# Patient Record
Sex: MALE | Race: WHITE | NOT HISPANIC OR LATINO | Employment: OTHER | URBAN - METROPOLITAN AREA
[De-identification: names, ages, dates, MRNs, and addresses within clinical notes are randomized per-mention and may not be internally consistent; named-entity substitution may affect disease eponyms.]

---

## 2023-08-30 ENCOUNTER — APPOINTMENT (OUTPATIENT)
Dept: LAB | Facility: CLINIC | Age: 63
End: 2023-08-30
Payer: MEDICARE

## 2023-08-30 DIAGNOSIS — J45.20 MILD INTERMITTENT INTRINSIC ASTHMA WITHOUT STATUS ASTHMATICUS WITHOUT COMPLICATION: ICD-10-CM

## 2023-08-30 DIAGNOSIS — Z83.3 FAMILY HISTORY OF DIABETES MELLITUS: ICD-10-CM

## 2023-08-30 DIAGNOSIS — Z85.118 PERSONAL HISTORY OF MALIGNANT NEOPLASM OF LUNG: ICD-10-CM

## 2023-08-30 DIAGNOSIS — E04.2 NONTOXIC MULTINODULAR GOITER: ICD-10-CM

## 2023-08-30 DIAGNOSIS — R53.83 FATIGUE, UNSPECIFIED TYPE: ICD-10-CM

## 2023-08-30 LAB — TSH SERPL DL<=0.05 MIU/L-ACNC: 0.48 UIU/ML (ref 0.45–4.5)

## 2023-08-30 PROCEDURE — 84443 ASSAY THYROID STIM HORMONE: CPT

## 2024-05-31 ENCOUNTER — APPOINTMENT (OUTPATIENT)
Dept: LAB | Facility: CLINIC | Age: 64
End: 2024-05-31
Payer: MEDICARE

## 2024-05-31 DIAGNOSIS — Z83.3 FAMILY HISTORY OF DIABETES MELLITUS: ICD-10-CM

## 2024-05-31 DIAGNOSIS — J45.20 MILD INTERMITTENT ASTHMA WITHOUT COMPLICATION: ICD-10-CM

## 2024-05-31 DIAGNOSIS — R63.4 ABNORMAL WEIGHT LOSS: ICD-10-CM

## 2024-05-31 DIAGNOSIS — R53.83 OTHER FATIGUE: ICD-10-CM

## 2024-05-31 DIAGNOSIS — E04.2 NONTOXIC MULTINODULAR GOITER: ICD-10-CM

## 2024-05-31 LAB
T3 SERPL-MCNC: 1.1 NG/ML
T3FREE SERPL-MCNC: 3.64 PG/ML (ref 2.5–3.9)
T4 SERPL-MCNC: 7.98 UG/DL (ref 6.09–12.23)

## 2024-05-31 PROCEDURE — 84436 ASSAY OF TOTAL THYROXINE: CPT

## 2024-05-31 PROCEDURE — 84480 ASSAY TRIIODOTHYRONINE (T3): CPT

## 2024-05-31 PROCEDURE — 84481 FREE ASSAY (FT-3): CPT

## 2024-05-31 PROCEDURE — 36415 COLL VENOUS BLD VENIPUNCTURE: CPT

## 2024-09-17 ENCOUNTER — OFFICE VISIT (OUTPATIENT)
Dept: PULMONOLOGY | Facility: MEDICAL CENTER | Age: 64
End: 2024-09-17
Payer: MEDICARE

## 2024-09-17 VITALS
SYSTOLIC BLOOD PRESSURE: 138 MMHG | TEMPERATURE: 98.2 F | OXYGEN SATURATION: 97 % | RESPIRATION RATE: 12 BRPM | DIASTOLIC BLOOD PRESSURE: 80 MMHG | WEIGHT: 176 LBS | HEART RATE: 82 BPM | HEIGHT: 67 IN | BODY MASS INDEX: 27.62 KG/M2

## 2024-09-17 DIAGNOSIS — J39.8 TRACHEAL COMPRESSION: Primary | ICD-10-CM

## 2024-09-17 DIAGNOSIS — C34.12 MALIGNANT NEOPLASM OF UPPER LOBE, LEFT BRONCHUS OR LUNG (HCC): ICD-10-CM

## 2024-09-17 DIAGNOSIS — E03.8 OTHER SPECIFIED HYPOTHYROIDISM: ICD-10-CM

## 2024-09-17 PROCEDURE — 99204 OFFICE O/P NEW MOD 45 MIN: CPT | Performed by: STUDENT IN AN ORGANIZED HEALTH CARE EDUCATION/TRAINING PROGRAM

## 2024-09-17 RX ORDER — CHOLECALCIFEROL (VITAMIN D3) 125 MCG
1000 CAPSULE ORAL DAILY
COMMUNITY

## 2024-09-17 RX ORDER — MELOXICAM 15 MG/1
TABLET ORAL
COMMUNITY

## 2024-09-17 RX ORDER — TURMERIC 400 MG
25 CAPSULE ORAL
COMMUNITY

## 2024-09-17 RX ORDER — GINSENG 100 MG
50 CAPSULE ORAL
COMMUNITY

## 2024-09-17 RX ORDER — ALBUTEROL SULFATE 90 UG/1
INHALANT RESPIRATORY (INHALATION)
COMMUNITY

## 2024-09-17 NOTE — PROGRESS NOTES
Pulmonary Outpatient Consultation Note   Miguel ABDUL 64 y.o. 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  I do not have any direct imaging to see how bad the compression is.  He has a extensive history that involves adenocarcinoma with a left upper lobe lobectomy, undergoing PET scan tomorrow.  He normally follows up with [facility] oncology group endocrinology group and pulmonary.  But now that he moved to this area he wants to see pulmonary and endocrinology here.  Apparently ENT looked at his vocal cords and says he has vocal cord dysfunction.  He does sound like he has tracheal abnormalities based on his expiratory sounds.  He feels like he is slowly progressing.  I discussed that he may require thyroidectomy which was discussed with him before by ENT surgery at [facility].  He wants to see endocrinology locally and see what their opinion is, he has had several appointments canceled unfortunately and keeps getting pushed back.  I will message his endocrinologist.  4 mm right pleural nodule, PET scan tomorrow by oncology service  Non-smoker  Stage III non-small cell adenocarcinoma status post left upper lobectomy with lymph node dissection, radiation and durvalumab which he no longer on due to joint discomfort follows with [facility] oncology.  No longer seeing thoracic surgery  Mild intermittent asthma, has tried Breo, Arnuity in the past, also rescue inhalers.  No relief.  I suspect this is more so from his upper airway with tracheal compression.  This has been going on for several years.  There is no imminent need to send him to the emergency room or stat referral to ENT for tracheostomy.    Plan:    Unfortunately most of his breathing issues are likely from tracheal  compression from the goiter.  I have no remedies for this.  He is waiting to see endocrinology and looking forward to their opinion to decide if he needs a thyroidectomy.  Regarding his mild asthma, inhalers did not work for him before, he does have rescue inhaler he can use as needed.  Obtain PFTs to evaluate for the lung functions  I will message his endocrinologist  He has a PET scan tomorrow at [facility], he will let me know if there are any abnormal findings or anything he needs to be biopsied.  No inhalers as of now  Follow up in 6 months   I have spent a total time of 42 minutes in caring for this patient on the day of the visit/encounter including Diagnostic results, Risks and benefits of tx options, Patient and family education, Importance of tx compliance, Impressions, Counseling / Coordination of care, Documenting in the medical record, Reviewing / ordering tests, medicine, procedures  , Obtaining or reviewing history  , and Communicating with other healthcare professionals .    History of Present Illness   HPI:    64-year-old gentleman referred to pulmonary, past medical history of stage III non-small cell lung cancer status post surgery chemotherapy radiation durvalumab, underwent lobectomy in 2019 along with lymph node dissection, pathology showed invasive adenocarcinoma with papillary micropapillary and solid patterns along with lymphovascular invasion with 26 node removal, PD-L1 positive.  He was started on pemetrexed and cisplatin in 2020 and did 4 cycles followed by radiation, he then started durvalumab in September 2020 and stopped because of abdominal pain.  Now most recently on surveillance by hematology at [facility].  He also follows with ENT for thyroid nodules as he has a large multinodular thyroid goiter with mediastinal extension, they held off on surgery as they felt he would be a poor candidate under general anesthesia.  He also saw pulmonary because he developed wheezing  after his lobectomy, did not have significant improvement with inhalers..  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    He is now here to establish care locally as he no longer sees thoracic surgery or pulmonary of [place].  He still continues to see oncology with [facility].  He now wants to establish with endocrinology but his appointment keeps getting canceled.  He is here today to establish with pulmonary for his possible asthma and tracheal compression.  He is able to do his most activities with no significant issues, he is going for a PET scan tomorrow.  He does not really use his rescue inhaler as he did not gain significant benefit from it.  Lifetime non-smoker, no significant family history of lung disease.  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    Father was a smoker, patient has second hand smoke     PFT September 2020 with mild obstructive defect, FEV1 2.5 which is 80%, FVC of 4.19 which is 100%, ratio at 60.  Scooped out pattern.    Care Everywhere reviewed        Review of Systems   Constitutional: Negative.    HENT:  Positive for voice change. Negative for postnasal drip, rhinorrhea and trouble swallowing.    Eyes: Negative.    Respiratory:  Positive for choking and wheezing. Negative for stridor.    Cardiovascular: Negative.    Gastrointestinal: Negative.    Endocrine: Negative.    Genitourinary: Negative.    Musculoskeletal: Negative.    Skin: Negative.    Allergic/Immunologic: Positive for immunocompromised state.   Neurological: Negative.    Hematological:  Positive for adenopathy.   Psychiatric/Behavioral: Negative.          Historical Information    No past medical history on file. Lung cancer   No past surgical history on file. ELIER lobectomy   No  "family history on file. Father was a smoker     Occupational History:      Social History     Tobacco Use   Smoking Status Never   Smokeless Tobacco Never       Meds/Allergies     Current Outpatient Medications:     albuterol (PROVENTIL HFA,VENTOLIN HFA) 90 mcg/act inhaler, Inhale, Disp: , Rfl:     Cholecalciferol (Vitamin D) 125 MCG (5000 UT) CAPS, Take 1,000 capsules by mouth daily, Disp: , Rfl:     MAGNESIUM PO, Take 200 mg by mouth, Disp: , Rfl:     meloxicam (MOBIC) 15 mg tablet, 1 BY MOUTH EVERY DAY FOR 14 DAYS THEN ONCE DAILY AS NEEDED FOR PAIN, Disp: , Rfl:     Turmeric 400 MG CAPS, Take 25 mcg by mouth, Disp: , Rfl:     Zinc 50 MG TABS, Take 50 mg by mouth, Disp: , Rfl:   No Known Allergies    Vitals: Blood pressure 138/80, pulse 82, temperature 98.2 °F (36.8 °C), temperature source Temporal, resp. rate 12, height 5' 6.5\" (1.689 m), weight 79.8 kg (176 lb), SpO2 97%., Body mass index is 27.98 kg/m². Oxygen Therapy  SpO2: 97 %    Physical Exam:    GEN: alert and oriented x 3, pleasant and cooperative   HEENT:  Normocephalic, atraumatic, enlarged neck goiter, non tender   NECK: No JVD   HEART: Rate, normal S1 and S2  LUNGS: Clear to auscultation bilaterally; no wheezes, rales, or rhonchi; respiration nonlabored   ABDOMEN:  Normoactive bowel sounds, soft, no tenderness, no distention  EXTREMITIES: no edema  NEURO: no gross focal findings  SKIN:  Dry, intact, warm to touch    Labs: I have personally reviewed pertinent lab results.  No results found for: \"IGE\"    Imaging and other studies: Reviewed radiology reports from this admission including: CT chest.  Left upper lobe lobectomy, 4 mm right pleural nodule.  Unable to view images    Pulmonary function testing:  Personally interpreted by me  Mild obstruction on spirometry    Other Studies: Reviewed radiology reports from this admission including: Care Everywhere reviewed.    Enrrique Mercado MD  Pulmonary and Critical Care Medicine     "

## 2024-09-17 NOTE — PATIENT INSTRUCTIONS
It was a pleasure seeing you today!    Obtain PFT  Follow up in 6 months     Enrrique Mercado MD  Pulmonary and Critical Care Medicine

## 2024-09-17 NOTE — Clinical Note
Hey! I hope all is well. This patient used to follow with ENT, endocrinology, thoracic surgery and oncology at [facility].  Now here to establish care.  Large goiter which has been relatively stable for several years which is compressing his trachea.  He says he has some abnormal labs.  He is hoping to see you soon and I guess the appointments keep getting canceled.  He is looking forward to your opinion regarding further management and thyroidectomy. I think this is causing his WHITEHEAD/SOB not really pulmonary related as much. Thanks - Enrrique

## 2024-09-19 ENCOUNTER — APPOINTMENT (OUTPATIENT)
Dept: LAB | Facility: HOSPITAL | Age: 64
End: 2024-09-19
Attending: STUDENT IN AN ORGANIZED HEALTH CARE EDUCATION/TRAINING PROGRAM
Payer: MEDICARE

## 2024-09-19 DIAGNOSIS — E03.8 OTHER SPECIFIED HYPOTHYROIDISM: ICD-10-CM

## 2024-09-19 LAB
T3 SERPL-MCNC: 1 NG/ML
T3FREE SERPL-MCNC: 3.23 PG/ML (ref 2.5–3.9)
T4 FREE SERPL-MCNC: 0.89 NG/DL (ref 0.61–1.12)
TSH SERPL DL<=0.05 MIU/L-ACNC: 0.21 UIU/ML (ref 0.45–4.5)

## 2024-09-19 PROCEDURE — 84481 FREE ASSAY (FT-3): CPT

## 2024-09-19 PROCEDURE — 84439 ASSAY OF FREE THYROXINE: CPT

## 2024-09-19 PROCEDURE — 84443 ASSAY THYROID STIM HORMONE: CPT

## 2024-09-19 PROCEDURE — 36415 COLL VENOUS BLD VENIPUNCTURE: CPT

## 2024-09-19 PROCEDURE — 84480 ASSAY TRIIODOTHYRONINE (T3): CPT

## 2024-09-24 ENCOUNTER — TELEPHONE (OUTPATIENT)
Dept: PULMONOLOGY | Facility: CLINIC | Age: 64
End: 2024-09-24

## 2024-09-24 ENCOUNTER — HOSPITAL ENCOUNTER (OUTPATIENT)
Dept: PULMONOLOGY | Facility: HOSPITAL | Age: 64
Discharge: HOME | End: 2024-09-24
Attending: STUDENT IN AN ORGANIZED HEALTH CARE EDUCATION/TRAINING PROGRAM
Payer: MEDICARE

## 2024-09-24 DIAGNOSIS — J39.8 TRACHEAL COMPRESSION: ICD-10-CM

## 2024-09-24 PROCEDURE — 94726 PLETHYSMOGRAPHY LUNG VOLUMES: CPT | Performed by: STUDENT IN AN ORGANIZED HEALTH CARE EDUCATION/TRAINING PROGRAM

## 2024-09-24 PROCEDURE — 94726 PLETHYSMOGRAPHY LUNG VOLUMES: CPT

## 2024-09-24 PROCEDURE — 94729 DIFFUSING CAPACITY: CPT

## 2024-09-24 PROCEDURE — 94060 EVALUATION OF WHEEZING: CPT

## 2024-09-24 PROCEDURE — 94760 N-INVAS EAR/PLS OXIMETRY 1: CPT

## 2024-09-24 PROCEDURE — 94060 EVALUATION OF WHEEZING: CPT | Performed by: STUDENT IN AN ORGANIZED HEALTH CARE EDUCATION/TRAINING PROGRAM

## 2024-09-24 PROCEDURE — 94729 DIFFUSING CAPACITY: CPT | Performed by: STUDENT IN AN ORGANIZED HEALTH CARE EDUCATION/TRAINING PROGRAM

## 2024-09-24 RX ORDER — ALBUTEROL SULFATE 0.83 MG/ML
2.5 SOLUTION RESPIRATORY (INHALATION) ONCE
Status: COMPLETED | OUTPATIENT
Start: 2024-09-24 | End: 2024-09-24

## 2024-09-24 RX ADMIN — ALBUTEROL SULFATE 2.5 MG: 2.5 SOLUTION RESPIRATORY (INHALATION) at 09:59

## 2024-09-24 NOTE — TELEPHONE ENCOUNTER
Pulmonary    Discussed PFTs. Compared to his previous PFT at the other hospital there is slight improvement. Of note, he had his outside hospital PET scan, he will see Oncology tomorrow and discuss. All questions answered.     SK

## 2024-10-01 ENCOUNTER — TELEPHONE (OUTPATIENT)
Dept: ENDOCRINOLOGY | Facility: CLINIC | Age: 64
End: 2024-10-01

## 2024-10-02 ENCOUNTER — OFFICE VISIT (OUTPATIENT)
Dept: OBGYN CLINIC | Facility: CLINIC | Age: 64
End: 2024-10-02
Payer: MEDICARE

## 2024-10-02 ENCOUNTER — TELEPHONE (OUTPATIENT)
Age: 64
End: 2024-10-02

## 2024-10-02 ENCOUNTER — APPOINTMENT (OUTPATIENT)
Dept: RADIOLOGY | Facility: CLINIC | Age: 64
End: 2024-10-02
Payer: MEDICARE

## 2024-10-02 VITALS
HEIGHT: 67 IN | DIASTOLIC BLOOD PRESSURE: 71 MMHG | HEART RATE: 78 BPM | WEIGHT: 176 LBS | BODY MASS INDEX: 27.62 KG/M2 | SYSTOLIC BLOOD PRESSURE: 119 MMHG

## 2024-10-02 DIAGNOSIS — M54.50 LOW BACK PAIN, UNSPECIFIED BACK PAIN LATERALITY, UNSPECIFIED CHRONICITY, UNSPECIFIED WHETHER SCIATICA PRESENT: ICD-10-CM

## 2024-10-02 DIAGNOSIS — M16.11 PRIMARY OSTEOARTHRITIS OF ONE HIP, RIGHT: ICD-10-CM

## 2024-10-02 DIAGNOSIS — M54.16 CHRONIC RIGHT-SIDED LUMBAR RADICULOPATHY: Primary | ICD-10-CM

## 2024-10-02 PROCEDURE — 72100 X-RAY EXAM L-S SPINE 2/3 VWS: CPT

## 2024-10-02 PROCEDURE — 99203 OFFICE O/P NEW LOW 30 MIN: CPT | Performed by: ORTHOPAEDIC SURGERY

## 2024-10-02 NOTE — PROGRESS NOTES
Assessment/Plan:  1. Chronic right-sided lumbar radiculopathy  XR spine lumbar 2 or 3 views injury    Ambulatory referral to Physical Therapy      2. Primary osteoarthritis of one hip, right  Ambulatory referral to Physical Therapy          Miguel has right-sided hip pain consistent with hip osteoarthritis and chronic lumbar radiculopathy.  I do think if the hip joint is bothering him more than the lower back but he does have slight spinal asymmetry and mild degenerative changes on his x-ray in both lower back and right hip.  I recommended formal physical therapy as an initial treatment option working on both issues and increasing his range of motion of the hip.  If the pain persists or worsens we could consider dedicated x-rays of the right hip and intra-articular right hip cortisone injection in the future.  If he has increasing radicular symptoms then an MRI of the lumbar spine may be warranted.  Follow-up in 6 weeks after therapy is complete.      Subjective:   Miguel Rodgers is a 64 y.o. male who presents to the office for evaluation for ongoing lower back and hip pain.  He has a history of discomfort in the low back rating to the lateral and anterior portion of the right hip for the last several years.  He has had multiple evaluations and was told he had hip arthritis and he has also been told he has lumbar radiculopathy.  No significant treatment was ever offered other than oral anti-inflammatories and home exercises.  He states he has pain in his lower back rating to the right hip and bothers him in the side of the hip and posteriorly especially when he tries to put his shoes and socks on.  He feels discomfort in the anterior hip as well at times.  Driving gives him increased pain.  He states the pain does radiate down the leg at times but is not consistent.  He denies any recent injury or trauma.      Review of Systems   Constitutional:  Negative for chills, fever and unexpected weight change.   HENT:   Recommend:  Flonase 2 sprays each nostril once daily x 2 weeks (this will help with congestion, post nasal drip and cough but take a few days to be fully effective)  For more rapid relief of congestion can use sudafed (ask the pharmacist for this) 60 mg every 8 hours    For headache, body aches, pain, fever - tylenol (acetaminophen) 2 tablets every 8 hours, and ibuprofen 2 tablets (400 mg) every 6-8 hours with food.     For throat - salt water gargles, throat lozenges.    Allen foods.    Lots of fluid.   Rest.    If fever above 100.4 for more than 48 hours, should seek repeat evaluation.   Return or go to the ER if worse.     For tooth: contact your dentist in the morning.  Would recommend follow up if not resolving within next 1 day.  If any increased swelling, pain, swelling under neck - go to the ER.       Negative for hearing loss, nosebleeds and sore throat.    Eyes:  Negative for pain, redness and visual disturbance.   Respiratory:  Negative for cough, shortness of breath and wheezing.    Cardiovascular:  Negative for chest pain, palpitations and leg swelling.   Gastrointestinal:  Negative for abdominal pain, nausea and vomiting.   Endocrine: Negative for polyphagia and polyuria.   Genitourinary:  Negative for dysuria and hematuria.   Musculoskeletal:         See HPI   Skin:  Negative for rash and wound.   Neurological:  Negative for dizziness, numbness and headaches.   Psychiatric/Behavioral:  Negative for decreased concentration and suicidal ideas. The patient is not nervous/anxious.          Past Medical History:   Diagnosis Date    Lung cancer (HCC)        Past Surgical History:   Procedure Laterality Date    LUNG LOBECTOMY      upper    TONSILLECTOMY         Family History   Problem Relation Age of Onset    Lung cancer Mother     Diabetes Brother        Social History     Occupational History    Not on file   Tobacco Use    Smoking status: Never    Smokeless tobacco: Never   Vaping Use    Vaping status: Never Used   Substance and Sexual Activity    Alcohol use: Never    Drug use: Never    Sexual activity: Not on file         Current Outpatient Medications:     albuterol (PROVENTIL HFA,VENTOLIN HFA) 90 mcg/act inhaler, Inhale, Disp: , Rfl:     Cholecalciferol (Vitamin D) 125 MCG (5000 UT) CAPS, Take 1,000 capsules by mouth daily, Disp: , Rfl:     MAGNESIUM PO, Take 200 mg by mouth, Disp: , Rfl:     meloxicam (MOBIC) 15 mg tablet, 1 BY MOUTH EVERY DAY FOR 14 DAYS THEN ONCE DAILY AS NEEDED FOR PAIN, Disp: , Rfl:     Turmeric 400 MG CAPS, Take 25 mcg by mouth, Disp: , Rfl:     Zinc 50 MG TABS, Take 50 mg by mouth, Disp: , Rfl:     No Known Allergies    Objective:  Vitals:    10/02/24 1013   BP: 119/71   Pulse: 78     Pain Score:   2      Right Hip Exam     Range of Motion   Extension:  normal   Flexion:  normal    External rotation:  normal   Internal rotation:  25 abnormal     Muscle Strength   Abduction: 5/5   Adduction: 5/5   Flexion: 5/5     Tests   ARMAAN: negative    Other   Erythema: absent  Sensation: normal  Pulse: present      Back Exam     Tenderness   The patient is experiencing tenderness in the lumbar.    Range of Motion   Extension:  normal   Flexion:  normal     Muscle Strength   Right Quadriceps:  5/5   Left Quadriceps:  5/5   Right Hamstrings:  5/5   Left Hamstrings:  5/5     Tests   Straight leg raise right: negative  Straight leg raise left: negative    Other   Sensation: normal  Gait: normal             Physical Exam  Vitals reviewed.   Constitutional:       Appearance: He is well-developed.   HENT:      Head: Normocephalic and atraumatic.   Eyes:      Conjunctiva/sclera: Conjunctivae normal.      Pupils: Pupils are equal, round, and reactive to light.   Cardiovascular:      Rate and Rhythm: Normal rate.      Pulses: Normal pulses.   Pulmonary:      Effort: Pulmonary effort is normal. No respiratory distress.   Musculoskeletal:      Cervical back: Normal range of motion and neck supple.      Lumbar back: Negative right straight leg raise test and negative left straight leg raise test.      Comments: As noted in HPI   Skin:     General: Skin is warm and dry.   Neurological:      General: No focal deficit present.      Mental Status: He is alert and oriented to person, place, and time.   Psychiatric:         Mood and Affect: Mood normal.         Behavior: Behavior normal.         I have personally reviewed pertinent films in PACS and my interpretation is as follows:  X-ray lumbar spine demonstrates slight spinal asymmetry to the right.  Moderate facet arthropathy.  No significant disc degeneration.      This document was created using speech voice recognition software.   Grammatical errors, random word insertions, pronoun errors, and incomplete sentences are an occasional consequence of this system due to  software limitations, ambient noise, and hardware issues.   Any formal questions or concerns about content, text, or information contained within the body of this dictation should be directly addressed to the provider for clarification.

## 2024-10-02 NOTE — TELEPHONE ENCOUNTER
Spoke with patient  informed him PET  CD was sent to hospital to be uploaded into his chart. Once CD is returned to office we will call patient to pick it up.

## 2024-10-02 NOTE — TELEPHONE ENCOUNTER
Patient called to let us know that he gave Dr. Mercado a disc of a pet scan he had and is calling to see if we mailed the disc back to him because that's what he was told in the office, that the disc will be mailed back. Brittni is going to check and call the patient back.      Thank you.

## 2024-10-03 ENCOUNTER — EVALUATION (OUTPATIENT)
Dept: PHYSICAL THERAPY | Facility: CLINIC | Age: 64
End: 2024-10-03
Payer: MEDICARE

## 2024-10-03 DIAGNOSIS — M54.16 LUMBAR RADICULOPATHY: Primary | ICD-10-CM

## 2024-10-03 DIAGNOSIS — M16.11 PRIMARY OSTEOARTHRITIS OF ONE HIP, RIGHT: ICD-10-CM

## 2024-10-03 DIAGNOSIS — M54.16 CHRONIC RIGHT-SIDED LUMBAR RADICULOPATHY: ICD-10-CM

## 2024-10-03 PROCEDURE — 97162 PT EVAL MOD COMPLEX 30 MIN: CPT

## 2024-10-03 PROCEDURE — 97110 THERAPEUTIC EXERCISES: CPT

## 2024-10-03 PROCEDURE — 97530 THERAPEUTIC ACTIVITIES: CPT

## 2024-10-03 NOTE — PROGRESS NOTES
PT Evaluation     Today's date: 10/3/2024  Patient name: Miguel Rodgers  : 1960  MRN: [id number]  Referring provider: Suresh Laird DO  Dx:   Encounter Diagnosis     ICD-10-CM    1. Lumbar radiculopathy  M54.16       2. Chronic right-sided lumbar radiculopathy  M54.16 Ambulatory referral to Physical Therapy      3. Primary osteoarthritis of one hip, right  M16.11 Ambulatory referral to Physical Therapy          Start Time: 930  Stop Time: 101  Total time in clinic (min): 45 minutes    Assessment  Impairments: abnormal gait, abnormal or restricted ROM, impaired physical strength, lacks appropriate home exercise program, pain with function, weight-bearing intolerance, poor posture  and activity limitations  Functional limitations: bending , lifting, squatting  Symptom irritability: moderate    Assessment details: Signs and symptoms consistent with lumbar spine dysfunction.  Mild hip ROM deficits but noted increased hip and thigh pain with trunk flexion activities and preferential movements noted with trunk neutral to trunk extension positioning.  Pt would benefit from trunk stabilization program and education on neutral spine positioning for home and yardwork to reduce pains radiating from lumbar spine into right hip and distal right LE.    Understanding of Dx/Px/POC: good     Prognosis: good    Plan  Patient would benefit from: skilled physical therapy    Planned therapy interventions: abdominal trunk stabilization, postural training, strengthening, stretching, therapeutic activities, therapeutic exercise, functional ROM exercises, therapeutic training, home exercise program, neuromuscular re-education, activity modification and flexibility  Speech planned therapy intervention: home exercise program    Frequency: 2x week  Duration in weeks: 6  Plan of Care beginning date: 10/3/2024  Plan of Care expiration date: 2024  Treatment plan discussed with: patient       Short term goals: 4   - Improve  "walking tolerance to 15 minutes to perform household activity  - Patient to be able to safely negotiate 4-6\" steps (curbs) with minimal pain and no compensatory motions  - Patient will be able to perform sit to stand transfers from low chair without increased pain  - Patient to reduce pain to 4/10 at its worst to improve activity tolerance  - Initiate and advance home exercise program to self manage symptoms.-   - Improve postural awareness and demonstrate self postural correction.  - Improve AROM lumbar spine to minimal limitation or better to improve mobility  - Patient to reduce pain at worst to 5/10 at worst in sitting/lifting  to improve activity tolerance.    Long term goals: 8 weeks   - Improve walking tolerance to 30 minutes   - Patient to improve hip range of motion to normal limits  - Patient to improve proximal hip strength to 4+/5   - Patient to be able to safely negotiate 8\" steps (10) with minimal pain and no compensatory motions  - Patient to reduce pain to 1/10 at its worst to improve QOL and return to function  - Patient will perform squatting with good mechanics and lifting crate from floor to chest without compensation and independently pain-free  - Patient will transfer out of all seated positioning without pain   - Patient to exhibit full independence with home exercise program for symptoms relief and prevention   - Patient to achieve lumbar ROM to WFL without increased pain.  - Improve LE strength to 5/5 grossly to improve general mobility  - Improve FOTO score to greater than predicted values   - sleeping without pain and lifting without pain    OBJECTIVE: * denote pain     POSTURE:     Standing: depressed left shoulder  Sitting: rounded lumbar spine        Lumbar AROM limitations:  (* =  Pain) Evaluation Re-evaluation Re-evaluation Re-evaluation Re-evaluation   Date: 10/3/24       Lumbar flexion:            M*       Lumbar extension:       M dec. pain       R side glide:                     "   Left side glide        R sidebend M       L sidebend Min *       (S) Severe ,(M) =Moderate, (Min) =Minimal (N) Normal tightness        Mechanical Asessment: pre-test symtpoms include 1/10 pain in standing- incr. Pain with standing flexion and left sidebending                                                                Repeated Exten in Lying (REIL) 1x10:  No pain   Repeated Flexion in Lying    Increases hip pain       Myotomes LE Right  Left   L1/L2 Hip flexion         5         5   L3 knee extension         5         5   L4 Ankle dorsiflexion         5         5   L5 Big Toe extension         5         5   S1 Ankle plantar flexion         5         5   s2 Knee FLexion         5         5         DTR's:   Patella R: 2+  Patella L: 2+  Achilles R: 2+  Achilles L: 2+      SPECIAL TESTS:  Slump (sciatic tension): NEG  SLR (disc pathology): NEH  Prone Instability: NEG  but positive ELY's with prone knee flexion at right SI joint and hip extension       MMT Lower Extremity Evaluation Re-evaluation Re-evaluation Re-evaluation Re-evaluation    0 out of 5  _/5 Right Left Right Left Right Left Right Left Right Left   Date 10/3/2024       Hip Flexion  4/5*  5/5           HIp Abduction  4*/5*   5/5           Hip Adduction -- --           HIp Extension  5/5  5/5           Knee Flexion 5/5 5 /5           Knee Extension 5/5 5/5           Ankle Dorsiflexion 5/5 5/5           Ankle Plantarflexion 5/5 5/5           Ankle Eversion 5/5 5/5           Ankle Inversion 5/5 5/5           Score 0=no contraction 1= Trace contraction, 2= Poor FROM antigravity, 3= Fair FROM against gravity, 4 FROM mod resist, 5=FROM max    Lower Extremity AROM Evaluation Re-evaluation Re-evaluation Re-evaluation Re-evaluation   Date 10/3/2024        RIght  Left RIght  Left Right Left Right Left Right Left   HIp Flexion 110*  120           HIp Extension  10 * 15           Hip Abduction 30 * 55           Hip IR 0dg  45 45           HIp ER 0dg 30 30            HIp IR 90dg 45 45           HIp ER 90dg 30 30           Knee Flexion  130  130           Knee Extension 0 0           *denotes pain      Flexibility Evaluation Re-evaluation Re-evaluation Re-evaluation Re-evaluation   Date 10/3/2024        Right Left Right  Left Right  Left Right  Left Right  Left   Hamstrings  45 M 45 M           HIp flexors min  min           Quadriceps  Min  min           Gastroc -- --           Gluteals  M * min           Piriformis  M *  min            (S) Severe ,(M) =Moderate, (Min) =Minimal (N) Normal tightness     Palpation/Observation: negative palpation at lateral hip or greater trochanter    Normal Reflexes 2+ ankle and knee    Function:   Neutral spine function:  Difficulty with maintaining abdominnal activation and neutral spine with supine stabs, sit to stand and squatting (moderate)     Gait:   10/3/2024; slight antalgic gait pattern with decreased weight shifting onto right side.  Single limb stance >20 seconds on the left.  10 seconds on the right no pain or deviation in pelvis but mild hip drop during late terminal stance on the right LE     Transfers:   10/3/2024 : Painful sit to stand out of a seated position    Stairs:  10/3/2024; Painful pushing with step up into right thigh    Squatting:   10/3/2024 :  poor body mechanics with increased trunk flexion noted.     Precautions:    Date  10/3/2024              Visits # EVAL  2 3  4  5   6   Manual                                                                               Neuro                                                                                               TE                                                                                               TA                posture education Seated in home and car                                                                Gait                                                               Modalities                                              HEP  x15'                Access Code: [email or web address]  URL: [email or web address]  Date: 10/03/2024  Prepared by: Amado Quiroz    Exercises  - Supine Bridge  - 1 x daily - 7 x weekly - 3 sets - 10 reps  - Supine Hip Adduction Isometric with Ball  - 1 x daily - 7 x weekly - 3 sets - 10 reps  - Hooklying Abduction with Resistance  - 1 x daily - 7 x weekly - 3 sets - 10 reps  - Prone Press Up  - 1 x daily - 7 x weekly - 3 sets - 10 reps  - Supine Single Leg Lift  - 1 x daily - 7 x weekly - 3 sets - 10 reps       Subjective Evaluation    History of Present Illness  Date of onset: 2024  Mechanism of injury: Complaints of lower back and right hip about 4 years with progressive worsening of pain.  States that the MD thinks that he may have some OA of right hip which is different from other x rays in the past he was told that it was bone on bone.   Reports that 4 years ago he had to use a cane to walk due to pain in hip but it calmed down since but pain is now more constant than intermittent.  X rays of lower spine abnormal curvature and DDD of lumbar spine.  Pain is in the anterior hip, lateral hip and back of the hamstrings and sometimes into the lateral aspect  of the thigh and stairs can increase the inner thigh pain.      Problems:  Lifting heavy objects  Yardwork  Mowing the lawn  Stairs            Recurrent probem    Patient Goals  Patient goals for therapy: decreased edema, decreased pain, increased motion, increased strength, return to sport/leisure activities and independence with ADLs/IADLs  Patient goal: reduce pain  Pain  Current pain ratin  At best pain ratin  At worst pain ratin  Location: leg pain is dull and mild , sharp pain with stairs inner thigh  Quality: dull ache, discomfort, tight and sharp  Relieving factors: rest, relaxation, support and change in position (inversion table, lying down,)  Aggravating factors: sitting, stair climbing and lifting (driving 1-2 hrs,)  Progression: worsening    Social  Support  Steps to enter house: yes  2  Stairs in house: yes ([employer] art studio)   12  Lives in: one-story house    Employment status: not working  Hand dominance: right  Exercise history: yardwork, moving around the house      Diagnostic Tests  X-ray: abnormal  Treatments  Previous treatment: physical therapy  Current treatment: physical therapy

## 2024-10-09 ENCOUNTER — OFFICE VISIT (OUTPATIENT)
Dept: PHYSICAL THERAPY | Facility: CLINIC | Age: 64
End: 2024-10-09
Payer: MEDICARE

## 2024-10-09 DIAGNOSIS — M54.16 CHRONIC RIGHT-SIDED LUMBAR RADICULOPATHY: Primary | ICD-10-CM

## 2024-10-09 DIAGNOSIS — M16.11 PRIMARY OSTEOARTHRITIS OF ONE HIP, RIGHT: ICD-10-CM

## 2024-10-09 PROCEDURE — 97530 THERAPEUTIC ACTIVITIES: CPT

## 2024-10-09 PROCEDURE — 97112 NEUROMUSCULAR REEDUCATION: CPT

## 2024-10-09 NOTE — PROGRESS NOTES
Daily Note     Today's date: 10/9/2024  Patient name: Miguel Rodgers  : 1960  MRN: [id number]  Referring provider: Suresh Laird DO  Dx:   Encounter Diagnosis     ICD-10-CM    1. Chronic right-sided lumbar radiculopathy  M54.16       2. Primary osteoarthritis of one hip, right  M16.11                      Subjective: doing well no pain.  No leg pain but mainly SI and lower back pain .  I was in the attic the other day and it aggrevated my lower back      Objective: See treatment diary below      Assessment: Tolerated treatment well. Patient would benefit from continued PT      Plan: Continue per plan of care.      Precautions:     Date  10/3/2024   10/9/24           Visits # EVAL  2 3  4  5   6   Manual                                                                               Neuro                iso ab ball squeeze    ball  15             iso ab hip fallouts    Rtb x 15 ea             iso abd pullovers   2# x 15             iso ab knee ext   X10 ea                            TE                                                                                      TA                posture education Seated in home and car     5'             bridging    x20             band rows/ext    x20             squatting    ball x 15             Gait                                                               Modalities                                               HEP  x15'

## 2024-10-11 ENCOUNTER — OFFICE VISIT (OUTPATIENT)
Dept: PHYSICAL THERAPY | Facility: CLINIC | Age: 64
End: 2024-10-11
Payer: MEDICARE

## 2024-10-11 DIAGNOSIS — M54.16 CHRONIC RIGHT-SIDED LUMBAR RADICULOPATHY: Primary | ICD-10-CM

## 2024-10-11 DIAGNOSIS — M54.16 LUMBAR RADICULOPATHY: ICD-10-CM

## 2024-10-11 PROCEDURE — 97112 NEUROMUSCULAR REEDUCATION: CPT

## 2024-10-11 NOTE — PROGRESS NOTES
Daily Note     Today's date: 10/11/2024  Patient name: Miguel Rodgers  : 1960  MRN: [id number]  Referring provider: Suresh Laird DO  Dx:   Encounter Diagnosis     ICD-10-CM    1. Chronic right-sided lumbar radiculopathy  M54.16       2. Lumbar radiculopathy  M54.16           Start Time: 0752  Stop Time: 0845  Total time in clinic (min): 53 minutes    Subjective: I have an ache in the knee/thigh when I am on my feet a while and a shopping cart will help reduce pain      Objective: See treatment diary below      Assessment: Tolerated treatment well. Patient would benefit from continued PT      Plan: Continue per plan of care.      Precautions:     Date  10/3/2024   10/9/24  10/12/24         Visits # EVAL  2 3  4  5   6   Manual                                                                               Neuro                iso ab ball squeeze    ball  15   x 20          iso ab hip fallouts    Rtb x 15 ea   x20          iso abd pullovers   2# x 15             iso ab knee ext   X10 ea   x20          clamshells      Rtb x 20          TE                                                                                      TA                posture education Seated in home and car     5'             bridging    x20   x20 with RTB          band rows/ext    x20   gtb x 20 ea          squatting    ball x 15    x20 ball         Gait                                                               Modalities                                               HEP  x15'

## 2024-10-16 ENCOUNTER — OFFICE VISIT (OUTPATIENT)
Dept: PHYSICAL THERAPY | Facility: CLINIC | Age: 64
End: 2024-10-16
Payer: MEDICARE

## 2024-10-16 DIAGNOSIS — M54.16 LUMBAR RADICULOPATHY: ICD-10-CM

## 2024-10-16 DIAGNOSIS — M54.16 CHRONIC RIGHT-SIDED LUMBAR RADICULOPATHY: Primary | ICD-10-CM

## 2024-10-16 PROCEDURE — 97112 NEUROMUSCULAR REEDUCATION: CPT

## 2024-10-16 PROCEDURE — 97530 THERAPEUTIC ACTIVITIES: CPT

## 2024-10-16 NOTE — PROGRESS NOTES
Daily Note     Today's date: 10/16/2024  Patient name: Miguel Rodgers  : 1960  MRN: [id number]  Referring provider: Suresh Laird DO  Dx:   Encounter Diagnosis     ICD-10-CM    1. Chronic right-sided lumbar radiculopathy  M54.16       2. Lumbar radiculopathy  M54.16                      Subjective: Doing better overall with mobility and pain.  I can dress better.       Objective: See treatment diary below      Assessment: Tolerated treatment well. Patient would benefit from continued PT.  Mild quad tightness left thigh compared to opp. Side.  Good to fair abdominal contraction with all  stabs, needs minor cueing       Plan: Continue per plan of care.      Precautions:     Date  10/3/2024   10/9/24  10/12/24  10/16/24       Visits # EVAL  2 3  4  5   6   Manual                PA glides                                                                Neuro                iso ab ball squeeze    ball  15   x 20  x20        iso ab hip fallouts    Rtb x 15 ea   x20  x20        iso abd pullovers   2# x 15  2# x20   x20        iso ab knee ext   X10 ea   x20  x20       Iso ab push     X20 FR       clamshells      Rtb x 20   x20        TE                                                                                      TA                posture education Seated in home and car     5'     press ups x10  Press up with OP x10        bridging    x20   x20 with RTB  x20         band rows/ext    x20   gtb x 20 ea  x20         squatting    ball x 15    x20 ball X20         Gait                                                               Modalities                                               HEP  x15'

## 2024-10-18 ENCOUNTER — OFFICE VISIT (OUTPATIENT)
Dept: PHYSICAL THERAPY | Facility: CLINIC | Age: 64
End: 2024-10-18
Payer: MEDICARE

## 2024-10-18 DIAGNOSIS — M54.16 LUMBAR RADICULOPATHY: ICD-10-CM

## 2024-10-18 DIAGNOSIS — M54.16 CHRONIC RIGHT-SIDED LUMBAR RADICULOPATHY: Primary | ICD-10-CM

## 2024-10-18 PROCEDURE — 97112 NEUROMUSCULAR REEDUCATION: CPT

## 2024-10-18 PROCEDURE — 97530 THERAPEUTIC ACTIVITIES: CPT

## 2024-10-18 NOTE — PROGRESS NOTES
Daily Note     Today's date: 10/18/2024  Patient name: Miguel Rodgers  : 1960  MRN: [id number]  Referring provider: Suresh Laird DO  Dx:   Encounter Diagnosis     ICD-10-CM    1. Chronic right-sided lumbar radiculopathy  M54.16       2. Lumbar radiculopathy  M54.16                      Subjective: doing ok       Objective: See treatment diary below      Assessment: Tolerated treatment well. Patient would benefit from continued PT.  Good understanding of lumbar stabs neutral position with table stabs but needs cueing to maintain form.        Plan: Continue per plan of care.      Precautions:     Date  10/3/2024   10/9/24  10/12/24  10/16/24  10/18/24     Visits # EVAL  2 3  4  5   6   Manual                PA glides                                                                Neuro                iso ab ball squeeze    ball  15   x 20  x20  x20      iso ab hip fallouts    Rtb x 15 ea   x20  x20  x20 gtb      iso abd pullovers   2# x 15  2# x20   x20 3#20         iso ab knee ext   X10 ea   x20  x20 Dead bug modified x10     Iso ab push     X20 FR  x20     clamshells      Rtb x 20   x20   rtbx20 ea     TE                                                                                      TA                posture education Seated in home and car     5'     press ups x10  Press up with OP x10  x10      bridging    x20   x20 with RTB  x20   x20  gtb      band rows/ext    x20   gtb x 20 ea  x20         squatting    ball x 15    x20 ball X20         Gait                                                               Modalities                                               HEP  x15'

## 2024-10-24 ENCOUNTER — OFFICE VISIT (OUTPATIENT)
Dept: PHYSICAL THERAPY | Facility: CLINIC | Age: 64
End: 2024-10-24
Payer: MEDICARE

## 2024-10-24 DIAGNOSIS — M54.16 CHRONIC RIGHT-SIDED LUMBAR RADICULOPATHY: Primary | ICD-10-CM

## 2024-10-24 PROCEDURE — 97112 NEUROMUSCULAR REEDUCATION: CPT

## 2024-10-24 PROCEDURE — 97530 THERAPEUTIC ACTIVITIES: CPT

## 2024-10-24 NOTE — PROGRESS NOTES
Daily Note     Today's date: 10/24/2024  Patient name: Miguel Rodgers  : 1960  MRN: [id number]  Referring provider: Suresh Laird DO  Dx:   Encounter Diagnosis     ICD-10-CM    1. Chronic right-sided lumbar radiculopathy  M54.16                      Subjective: had more hip pain this whole week as well as agina.  Md gave me nitro which helped but the hip was sore.       Objective: See treatment diary below      Assessment: Tolerated treatment well. Patient would benefit from continued PT.  Noting more of an antalgic gait pattern today due to incr. Hip pain.  Very good form with Zoey      Plan: Continue per plan of care.      Precautions:     Date  10/3/2024   10/9/24  10/12/24  10/16/24  10/18/24  10/24/24   Visits # EVAL  2 3  4  5   6   Manual                PA glides            5'     STM SI joint                                               Neuro                iso ab ball squeeze    ball  15   x 20  x20  x20 x20     iso ab hip fallouts    Rtb x 15 ea   x20  x20  x20 gtb  x20 gtb    iso abd pullovers   2# x 15  2# x20   x20 3#20    3# x20     iso ab knee ext   X10 ea   x20  x20 Dead bug modified x10 X15 ea    Iso ab push     X20 FR  x20 X20 fr    clamshells      Rtb x 20   x20   rtbx20 ea X20 rrb    TE               FIS          Ball x 20                                                               TA                posture education Seated in home and car     5'     press ups x10  Press up with OP x10  x10      bridging    x20   x20 with RTB  x20   x20  gtb Gtb x20     band rows/ext    x20   gtb x 20 ea  x20        Pail presses      Uni 6# x 15 ea     squatting    ball x 15    x20 ball X20    x20  X20     Gait                                                               Modalities                                               HEP  x15'                           [FreeTextEntry1] : 35 y/o for annual.   - PAP/HPV done today - advised withdrawal method is not reliable contraception - pt counseled not to get pregnant until baby is one year old due to  - RTO in 1 year

## 2024-10-25 NOTE — PROGRESS NOTES
Miguel Rodgers 64 y.o. male MRN: [id number]    Encounter: [id number]      Assessment & Plan     Assessment:  This is a 64 y.o.-year-old male with multinodular goiter, lung cancer, presenting consult for goiter, abnormal thyroid labs.    Plan:  1. Multinodular goiter  Assessment & Plan:  Diagnosed 2019  With tracheal deviation, compressive symptoms, patient occasionally chokes on soft foods does have intermittent hoarseness but this appears to be since his lung cancer surgery  Stable per the last endocrinologist patient saw, Dr. Claros in [place] who did a in-office ultrasound, also apparently stable per CT read  Patient is not currently following with an ENT    Due to significant goiter and nodule size, compressive symptoms, recommend thyroidectomy  Patient is currently undergoing cardiac evaluation and concern for rising CEA    Will have patient obtain formal ultrasound thyroid  Orders:  -     US thyroid; Future; Expected date: 10/28/2024  -     TSH + Free T4; Future  2. Abnormal TSH  Assessment & Plan:  Per patient historically normal TSH  Now mildly suppressed  on 2 separate occasions since at least May 2024  Did discuss differential of Graves' disease, toxic nodule, transient thyroiditis    Will repeat labs in 4 to 6 weeks (as patient did have iodine load with CTA 2 weeks ago)  Will obtain TSI to screen for Graves'  Orders:  -     Thyroid stimulating immunoglobulin; Future  -     US thyroid; Future; Expected date: 10/28/2024  -     TSH + Free T4; Future  3. Malignant neoplasm of upper lobe, left bronchus or lung (HCC)  Assessment & Plan:  Incidentally discovered during initial goiter evaluation in 2019  Now s/p lobectomy 2019, radiotherapy, completion of chemotherapy 2028  Now with rising CEA levels  Recent PET scan with a hypermetabolic lymph node posterior to left thyroid lobe, of unclear significance  4. Chest pain in adult  Assessment & Plan:  Patient is currently undergoing cardiac workup after recent  10/13 hospitalization for chest pain.  Still intermittently feeling chest pain and tightness, improved with nitro  Unremarkable echo, stress test  Is currently wearing Zio patch, is to have coronary CT next week    Encourage patient to follow-up with cardiology for more detailed nitro recommendations  Did discuss that this needs to be further evaluated before any kind of surgical plan can be made  5. Hoarseness  Assessment & Plan:  Intermittent, worse with voice strain, per patient since his lung cancer surgery 2019  Per records, did have inspiratory stridor postoperatively which is concerning for injury due to intubation  Did see ENT, told has left vocal cord dysfunction  Unlikely due to thyroid but did discuss with patient that goiter compression and thyroid surgery can also affect laryngeal nerve    I have spent a total time of 60+ minutes in caring for this patient on the day of the visit/encounter including Diagnostic results, Prognosis, Risks and benefits of tx options, Instructions for management, Patient and family education, Importance of tx compliance, Counseling / Coordination of care, Documenting in the medical record, Reviewing / ordering tests, medicine, procedures  , and Obtaining or reviewing history  .    3m fu    CC: Goiter, abnormal thyroid labs    History of Present Illness     64 y.o. male with past medical history significant for stage III non-small-cell lung cancer treated with lobectomy 2019, and radiotherapy, chemotherapy completed 2020 in remission, multinodular goiter.    PCP first palpated a large thyroid in 2019.  He saw endocrinology and underwent imaging which showed multinodular goiter, 2 large thyroid nodules.  He underwent FNA of the 3.1 cm right thyroid nodule (mixed solid and cystic) and 5.7 cm left thyroid nodule, both of which were Nowata II benign.  Presumably due to its size, he was sent to ENT (Dr. Salo Mc).  He was then discovered with lung nodule during workup, then  "any potential thyroid surgical plan was put on hold for the lung cancer diagnosis and treatment.    Patient has been following with pulmonary and oncology for cancer surveillance.  This summer, CEA is uptrending ,, 7.45 in September, 6.73 in August.  Underwent a PET scan 9/18/2024, with \"focal hyper metabolic nodule just posterior to an enlarged left thyroid lobe… Differential can include early neoplastic adenopathy\"  Oncology then sent him to ENT.  The ENTs partner Dr Dano Claros MD endocrinologist 10/10/2024- did in office thyroid ultrasound, considered nodules stable, could not visualize the lymph node, could not see a path towards an in office biopsy.  Patient reports that ENT will be leaving the practice and he has no follow-up scheduled    Patient did have a recent hospitalization 10/13-10/14 at [facility] for chest pain.  Discharged to cardiology follow-up on aspirin, Lipitor, did undergo CTA which redemonstrated goiter with left-sided mediastinal extension.  Cardiac workup with a low risk exercise stress echocardiogram, normal echo  Has seen cardiology outpatient, received prescription for nitroglycerin which she has used once with improvement to chest pain.  ZIO 21 day patch placed 3 days ago. Pending ct coronary cardiac calcium score, next week.  Further steps to be determined pending results.    Patient and wife do take supplements  Patient recently cut out all carbohydrates and went on to cleanse and has lost weight  He used to take spirullina but no longer  Has been eating 2-4 brazil nuts daily x years   Denies any supplements with seaweed, iodine    9/19/2024 TSH 0.213, free T40.89, free T3 3.23, T3 1  5/31/2024 TSH 0.13, free T40.9, free T33.64, T31.1, T47.98  8/2023 TSH 0.481, free T40.77  7/19/2024  fT4 1.1  Patient reports historically normal TSH    Review of Systems   Constitutional:  Negative for activity change, appetite change and unexpected weight change (lost some weight w carb " "restriction).   HENT:  Positive for trouble swallowing (softer foods and liquids) and voice change (intermittent hoarseness since lobectomy 2019).    Eyes:  Negative for photophobia, pain and redness.   Respiratory:  Negative for choking.    Endocrine: Negative for cold intolerance and heat intolerance.   Neurological:  Negative for tremors.       Historical Information   Past Medical History:   Diagnosis Date    Chest discomfort     Lung cancer (HCC)      Past Surgical History:   Procedure Laterality Date    LUNG LOBECTOMY      upper    TONSILLECTOMY       Social History   Social History     Substance and Sexual Activity   Alcohol Use Never     Social History     Substance and Sexual Activity   Drug Use Never     Social History     Tobacco Use   Smoking Status Never   Smokeless Tobacco Never     Family History:   Family History   Problem Relation Age of Onset    Lung cancer Mother     Heart disease Father     Diabetes Brother     No Known Problems Son        Meds/Allergies   Current Outpatient Medications   Medication Sig Dispense Refill    aspirin (ECOTRIN LOW STRENGTH) 81 mg EC tablet Take 81 mg by mouth daily      atorvastatin (LIPITOR) 20 mg tablet Take 20 mg by mouth daily      metoprolol tartrate (LOPRESSOR) 25 mg tablet Take 12.5 mg by mouth 2 (two) times a day      nitroglycerin (NITROSTAT) 0.4 mg SL tablet Place 0.4 mg under the tongue every 5 (five) minutes as needed for chest pain      albuterol (PROVENTIL HFA,VENTOLIN HFA) 90 mcg/act inhaler Inhale      meloxicam (MOBIC) 15 mg tablet 1 BY MOUTH EVERY DAY FOR 14 DAYS THEN ONCE DAILY AS NEEDED FOR PAIN       No current facility-administered medications for this visit.     No Known Allergies    Objective   Vitals: Blood pressure 108/70, pulse 65, height 5' 5.5\" (1.664 m), weight 80.7 kg (178 lb), SpO2 98%.    Physical Exam  Constitutional:       General: He is not in acute distress.     Appearance: Normal appearance. He is obese. He is not ill-appearing, " toxic-appearing or diaphoretic.      Comments: +tracheal deviation to right  + Intermittent voice hoarseness   HENT:      Head: Normocephalic and atraumatic.      Nose: Nose normal.   Eyes:      Extraocular Movements: Extraocular movements intact.      Conjunctiva/sclera: Conjunctivae normal.      Pupils: Pupils are equal, round, and reactive to light.   Neck:      Thyroid: No thyroid tenderness.      Comments: +goiter  Large palpable left sided nodule, and other palpable right sided nodules   Cardiovascular:      Rate and Rhythm: Normal rate.   Pulmonary:      Effort: Pulmonary effort is normal. No respiratory distress.   Abdominal:      General: There is no distension.   Musculoskeletal:         General: No deformity.      Right lower leg: No edema.      Left lower leg: No edema.   Lymphadenopathy:      Cervical: No cervical adenopathy.   Skin:     General: Skin is warm and dry.   Neurological:      General: No focal deficit present.      Mental Status: He is alert. Mental status is at baseline.   Psychiatric:         Mood and Affect: Mood normal.         Behavior: Behavior normal.         Thought Content: Thought content normal.         The history was obtained from the review of the chart, patient and family.    Lab Results:   Lab Results   Component Value Date/Time    TSH 3RD GENERATON 0.213 (L) 09/19/2024 03:39 PM    TSH 3RD GENERATON 0.130 (L) 05/31/2024 08:59 AM    Free T4 0.89 09/19/2024 03:39 PM    Free T4 0.90 05/31/2024 08:59 AM                 Imaging Studies:     PET SCAN 9/18/2024 9/18/2024 Neck and supraclavicular spaces:   There is a focal hypermetabolic node just posterior to an enlarged left thyroid lobe on image #50 with SUV max of 6.2 and measuring up to 1 cm in short axis. Differential can include early neoplastic adenopathy. There is normal heterogeneous cervical and supraclavicular uptake without evidence for hypermetabolic adenopathy.  There is bilaterally symmetric uptake at the muscles  "of phonation. There is possible congenital encephalomalacia versus post intervention changes at the anterior frontal lobe at the midline. The thyroid gland is diffusely enlarged and heterogeneous with extension into the superior mediastinum and rightward tracheal deviation. There is no significant hypermetabolism. These findings are similar to previous study. Correlate with thyroid ultrasound for further characterization if of clinical concern     10/13/2024 CTA chest with without contrast  CT angio chest  Final Result  1. No evidence of pulmonary embolism or aortic dissection.  2. Partial left pneumonectomy. No suspicious findings.  3. Goiter particularly on the left with mediastinal extension unchanged.    9/2/2020 CT soft tissue neck with contrast  \"Again seen is a multinodular thyroid goiter with the left lobe larger than the right. The right thyroid lobe has maximum dimensions of approximately 3.5 x 2.3 x 8 cm. The left thyroid lobe has maximum dimensions of approximately 4.6 x 5.7 x 9.8 cm. The isthmus has a maximum AP diameter of approximately 0.8 cm. There is deviation of the trachea to the right without evidence of luminal compromise or invasion. There is mediastinal extension of the left lobe of the thyroid gland that extends approximately 0.8 cm below the superior margin of the manubrium. The upper margin of the thyroid lobes extend to the level of the hyoid bone. \"        Portions of the record may have been created with voice recognition software. Occasional wrong word or \"sound a like\" substitutions may have occurred due to the inherent limitations of voice recognition software. Read the chart carefully and recognize, using context, where substitutions have occurred.    "

## 2024-10-28 ENCOUNTER — OFFICE VISIT (OUTPATIENT)
Dept: ENDOCRINOLOGY | Facility: CLINIC | Age: 64
End: 2024-10-28
Payer: MEDICARE

## 2024-10-28 ENCOUNTER — APPOINTMENT (OUTPATIENT)
Dept: PHYSICAL THERAPY | Facility: CLINIC | Age: 64
End: 2024-10-28
Payer: MEDICARE

## 2024-10-28 VITALS
WEIGHT: 178 LBS | OXYGEN SATURATION: 98 % | BODY MASS INDEX: 28.61 KG/M2 | DIASTOLIC BLOOD PRESSURE: 70 MMHG | SYSTOLIC BLOOD PRESSURE: 108 MMHG | HEIGHT: 66 IN | HEART RATE: 65 BPM

## 2024-10-28 DIAGNOSIS — C34.12 MALIGNANT NEOPLASM OF UPPER LOBE, LEFT BRONCHUS OR LUNG (HCC): ICD-10-CM

## 2024-10-28 DIAGNOSIS — J39.8 TRACHEAL DEVIATION: ICD-10-CM

## 2024-10-28 DIAGNOSIS — E04.2 MULTINODULAR GOITER: Primary | ICD-10-CM

## 2024-10-28 DIAGNOSIS — R07.9 CHEST PAIN IN ADULT: ICD-10-CM

## 2024-10-28 DIAGNOSIS — R79.89 ABNORMAL TSH: ICD-10-CM

## 2024-10-28 DIAGNOSIS — E04.9 SUBSTERNAL GOITER: ICD-10-CM

## 2024-10-28 DIAGNOSIS — R49.0 HOARSENESS: ICD-10-CM

## 2024-10-28 PROCEDURE — 99205 OFFICE O/P NEW HI 60 MIN: CPT | Performed by: INTERNAL MEDICINE

## 2024-10-28 RX ORDER — NITROGLYCERIN 0.4 MG/1
0.4 TABLET SUBLINGUAL
COMMUNITY
Start: 2024-10-21

## 2024-10-28 RX ORDER — ATORVASTATIN CALCIUM 20 MG/1
20 TABLET, FILM COATED ORAL DAILY
COMMUNITY
Start: 2024-10-14 | End: 2024-11-13

## 2024-10-28 RX ORDER — FOLIC ACID 1 MG/1
1 TABLET ORAL DAILY
COMMUNITY
End: 2024-10-28

## 2024-10-28 RX ORDER — ASPIRIN 81 MG/1
81 TABLET ORAL DAILY
COMMUNITY
Start: 2024-10-15 | End: 2024-11-14

## 2024-10-28 RX ORDER — METOPROLOL TARTRATE 25 MG/1
12.5 TABLET, FILM COATED ORAL 2 TIMES DAILY
COMMUNITY
Start: 2024-10-14 | End: 2024-11-13

## 2024-10-28 NOTE — ASSESSMENT & PLAN NOTE
Incidentally discovered during initial goiter evaluation in 2019  Now s/p lobectomy 2019, radiotherapy, completion of chemotherapy 2028  Now with rising CEA levels  Recent PET scan with a hypermetabolic lymph node posterior to left thyroid lobe, of unclear significance

## 2024-10-28 NOTE — ASSESSMENT & PLAN NOTE
Patient is currently undergoing cardiac workup after recent 10/13 hospitalization for chest pain.  Still intermittently feeling chest pain and tightness, improved with nitro  Unremarkable echo, stress test  Is currently wearing Zio patch, is to have coronary CT next week    Encourage patient to follow-up with cardiology for more detailed nitro recommendations  Did discuss that this needs to be further evaluated before any kind of surgical plan can be made

## 2024-10-28 NOTE — ASSESSMENT & PLAN NOTE
Intermittent, worse with voice strain, per patient since his lung cancer surgery 2019  Per records, did have inspiratory stridor postoperatively which is concerning for injury due to intubation  Did see ENT, told has left vocal cord dysfunction  Unlikely due to thyroid but did discuss with patient that goiter compression and thyroid surgery can also affect laryngeal nerve

## 2024-10-28 NOTE — PATIENT INSTRUCTIONS
--get labs in 4-6 weeks, early December   --get thyroid ultrasound at your convenience   --keep us updated from cardiac and ENT and oncology perspective

## 2024-10-28 NOTE — ASSESSMENT & PLAN NOTE
Diagnosed 2019  With tracheal deviation, compressive symptoms, patient occasionally chokes on soft foods does have intermittent hoarseness but this appears to be since his lung cancer surgery  Stable per the last endocrinologist patient saw, Dr. Claros in [place] who did a in-office ultrasound, also apparently stable per CT read  Patient is not currently following with an ENT    Due to significant goiter and nodule size, compressive symptoms, recommend thyroidectomy  Patient is currently undergoing cardiac evaluation and concern for rising CEA    Will have patient obtain formal ultrasound thyroid

## 2024-10-28 NOTE — ASSESSMENT & PLAN NOTE
Per patient historically normal TSH  Now mildly suppressed  on 2 separate occasions since at least May 2024  Did discuss differential of Graves' disease, toxic nodule, transient thyroiditis    Will repeat labs in 4 to 6 weeks (as patient did have iodine load with CTA 2 weeks ago)  Will obtain TSI to screen for Graves'

## 2024-10-29 ENCOUNTER — APPOINTMENT (OUTPATIENT)
Dept: PHYSICAL THERAPY | Facility: CLINIC | Age: 64
End: 2024-10-29
Payer: MEDICARE

## 2024-10-29 PROBLEM — E04.9 SUBSTERNAL GOITER: Status: ACTIVE | Noted: 2024-10-29

## 2024-11-01 ENCOUNTER — OFFICE VISIT (OUTPATIENT)
Dept: PHYSICAL THERAPY | Facility: CLINIC | Age: 64
End: 2024-11-01
Payer: MEDICARE

## 2024-11-01 ENCOUNTER — APPOINTMENT (OUTPATIENT)
Dept: PHYSICAL THERAPY | Facility: CLINIC | Age: 64
End: 2024-11-01
Payer: MEDICARE

## 2024-11-01 DIAGNOSIS — M54.16 CHRONIC RIGHT-SIDED LUMBAR RADICULOPATHY: Primary | ICD-10-CM

## 2024-11-01 PROCEDURE — 97530 THERAPEUTIC ACTIVITIES: CPT

## 2024-11-01 PROCEDURE — 97112 NEUROMUSCULAR REEDUCATION: CPT

## 2024-11-01 NOTE — PROGRESS NOTES
Daily Note     Today's date: 2024  Patient name: Miguel Rodgers  : 1960  MRN: [id number]  Referring provider: Suresh Laird DO  Dx:   Encounter Diagnosis     ICD-10-CM    1. Chronic right-sided lumbar radiculopathy  M54.16                      Subjective: PET scan showed increased signal in thyroid and heart pains have been happening so I have a monitor on me now.   Both anterior hips have been sore this week.   My inflammatory markers have been higher as per blood work       Objective: See treatment diary below      Assessment: Tolerated treatment well. Patient would benefit from continued PT, noting     Plan: Continue per plan of care.      Precautions:     Date  11/1/24   10/9/24  10/12/24  10/16/24  10/18/24  10/24/24   Visits # EVAL  2 3  4  5   6   Manual                PA glides            5'     STM SI joint                                               Neuro                iso ab ball squeeze x20   ball  15   x 20  x20  x20 x20     iso ab hip fallouts  Gtb x20  Rtb x 15 ea   x20  x20  x20 gtb  x20 gtb    iso abd pullovers 3# x20  2# x 15  2# x20   x20 3#20    3# x20     iso ab knee ext  x10 X10 ea   x20  x20 Dead bug modified x10 X15 ea    Iso ab push  x20   X20 FR  x20 X20 fr    clamshells   rtb x20    Rtb x 20   x20   rtbx20 ea X20 rrb    Dead bugs X10         TE               FIS          Ball x 20                                                               TA                posture education Seated in home and car     5'     press ups x10  Press up with OP x10  x10      bridging  x20  x20   x20 with RTB  x20   x20  gtb Gtb x20     band rows/ext    x20   gtb x 20 ea  x20        Pail presses      Uni 6# x 15 ea     squatting    ball x 15    x20 ball X20    x20  X20     Gait                                                               Modalities                                               HEP  x15'

## 2024-11-05 ENCOUNTER — OFFICE VISIT (OUTPATIENT)
Dept: URGENT CARE | Facility: CLINIC | Age: 64
End: 2024-11-05
Payer: MEDICARE

## 2024-11-05 VITALS
HEIGHT: 66 IN | TEMPERATURE: 98.7 F | SYSTOLIC BLOOD PRESSURE: 122 MMHG | WEIGHT: 176 LBS | DIASTOLIC BLOOD PRESSURE: 67 MMHG | BODY MASS INDEX: 28.28 KG/M2 | OXYGEN SATURATION: 96 % | RESPIRATION RATE: 18 BRPM | HEART RATE: 70 BPM

## 2024-11-05 DIAGNOSIS — L73.9 FOLLICULITIS: Primary | ICD-10-CM

## 2024-11-05 PROCEDURE — 99213 OFFICE O/P EST LOW 20 MIN: CPT | Performed by: PHYSICIAN ASSISTANT

## 2024-11-05 NOTE — PROGRESS NOTES
[facility]        NAME: Miguel Rodgers is a 64 y.o. male  : 1960    MRN: [id number]  DATE: 2024  TIME: 11:33 AM    Assessment and Plan   Folliculitis [L73.9]  1. Folliculitis          Patient Instructions   Folliculitis  No antibiotics given can you A&D ointment over the counter  Monitor for any worsening of symptoms    Follow up with PCP in 3-5 days.  Proceed to  ER if symptoms worsen.    If tests have been performed at [facility], our office will contact you with results if changes need to be made to the care plan discussed with you at the visit.  You can review your full results on [facility].    Chief Complaint     Chief Complaint   Patient presents with    Rash     Left side small cluster with oozing and crusting with 3 additional spots lower near the hip         History of Present Illness       Miguel is a 64-year-old male who presents to clinic complaining of 2 areas of rash on his left flank x 3 days.  He denies any itching or pain or burning.  He was not even aware they were there until his wife noticed when she went to put cream on his back.  Denies any bleeding or drainage from the rash.  Denies any fever or chills.        Review of Systems   Review of Systems   Constitutional:  Negative for chills and fever.   Skin:  Positive for rash.         Current Medications       Current Outpatient Medications:     aspirin (ECOTRIN LOW STRENGTH) 81 mg EC tablet, Take 81 mg by mouth daily, Disp: , Rfl:     atorvastatin (LIPITOR) 20 mg tablet, Take 20 mg by mouth daily, Disp: , Rfl:     metoprolol tartrate (LOPRESSOR) 25 mg tablet, Take 12.5 mg by mouth 2 (two) times a day, Disp: , Rfl:     nitroglycerin (NITROSTAT) 0.4 mg SL tablet, Place 0.4 mg under the tongue every 5 (five) minutes as needed for chest pain, Disp: , Rfl:     albuterol (PROVENTIL HFA,VENTOLIN HFA) 90 mcg/act inhaler, Inhale (Patient not taking: Reported on 2024), Disp: , Rfl:     meloxicam (MOBIC) 15 mg  "tablet, 1 BY MOUTH EVERY DAY FOR 14 DAYS THEN ONCE DAILY AS NEEDED FOR PAIN (Patient not taking: Reported on 11/5/2024), Disp: , Rfl:     Current Allergies     Allergies as of 11/05/2024    (No Known Allergies)            The following portions of the patient's history were reviewed and updated as appropriate: allergies, current medications, past family history, past medical history, past social history, past surgical history and problem list.     Past Medical History:   Diagnosis Date    Chest discomfort     Lung cancer (HCC)        Past Surgical History:   Procedure Laterality Date    LUNG LOBECTOMY      upper    TONSILLECTOMY         Family History   Problem Relation Age of Onset    Lung cancer Mother     Heart disease Father     Diabetes Brother     No Known Problems Son          Medications have been verified.        Objective   /67 (BP Location: Left arm, Patient Position: Sitting, Cuff Size: Standard)   Pulse 70   Temp 98.7 °F (37.1 °C) (Temporal)   Resp 18   Ht 5' 6\" (1.676 m)   Wt 79.8 kg (176 lb)   SpO2 96%   BMI 28.41 kg/m²   No LMP for male patient.       Physical Exam     Physical Exam  Vitals and nursing note reviewed.   Constitutional:       General: He is not in acute distress.     Appearance: Normal appearance. He is not ill-appearing.   Pulmonary:      Effort: Pulmonary effort is normal.   Skin:     Findings: Rash present. Rash is macular and papular. Rash is not crusting, pustular, urticarial or vesicular.          Neurological:      Mental Status: He is alert and oriented to person, place, and time.   Psychiatric:         Mood and Affect: Mood normal.         Behavior: Behavior normal.                   "

## 2024-11-06 ENCOUNTER — APPOINTMENT (OUTPATIENT)
Dept: PHYSICAL THERAPY | Facility: CLINIC | Age: 64
End: 2024-11-06
Payer: MEDICARE

## 2024-11-11 ENCOUNTER — TELEPHONE (OUTPATIENT)
Age: 64
End: 2024-11-11

## 2024-11-11 NOTE — TELEPHONE ENCOUNTER
Patient called to leave a message for Dr. Mercado. Patient stated he had a CTA chest done on 10/13 and a CT angio cardio lung and CT coronary calcium score done on 11/6 at [facility]. Patient is going to see his cardiologist on 11/18. Patient is asking if there is anything he should do from a pulmonary standpoint.       Please advise.

## 2024-11-12 ENCOUNTER — OFFICE VISIT (OUTPATIENT)
Dept: PHYSICAL THERAPY | Facility: CLINIC | Age: 64
End: 2024-11-12
Payer: MEDICARE

## 2024-11-12 DIAGNOSIS — M54.16 CHRONIC RIGHT-SIDED LUMBAR RADICULOPATHY: Primary | ICD-10-CM

## 2024-11-12 DIAGNOSIS — M54.16 LUMBAR RADICULOPATHY: ICD-10-CM

## 2024-11-12 PROCEDURE — 97112 NEUROMUSCULAR REEDUCATION: CPT

## 2024-11-12 NOTE — PROGRESS NOTES
PT Re-Evaluation     Today's date: 2024  Patient name: Miguel Rodgers  : 1960  MRN: [id number]  Referring provider: Suresh Laird DO  Dx:   Encounter Diagnosis     ICD-10-CM    1. Chronic right-sided lumbar radiculopathy  M54.16       2. Lumbar radiculopathy  M54.16                    Impairments: abnormal gait, abnormal or restricted ROM, impaired physical strength, lacks appropriate home exercise program, pain with function, weight-bearing intolerance, poor posture  and activity limitations  Functional limitations: bending , lifting, squatting  Symptom irritability: moderate/mild     Assessment details: Signs and symptoms consistent with lumbar spine dysfunction and Mild hip ROM deficits (capsular).  Painful weight bearing onto right side and pain at terminal stance along with reproduction of anterior hip pain with passive hip flexion/adduction.  Pt appears to respond well towards  these first 8 sessions trunk stabilization program as hip ROM and flexibility activities and may still continue to benefit from formal PT today address lateral hip weakness and generalized hip tightness.  Pt does relate general body aches and pains and advised to consult with MD to r/o systemic inflammatory conditions.  Understanding of Dx/Px/POC: good     Prognosis: good     Plan  Patient would benefit from: skilled physical therapy     Planned therapy interventions: abdominal trunk stabilization, postural training, strengthening, stretching, therapeutic activities, therapeutic exercise, functional ROM exercises, therapeutic training, home exercise program, neuromuscular re-education, activity modification and flexibility  Speech planned therapy intervention: home exercise program     Frequency: 2x week  Duration in weeks: 6  Plan of Care beginning date: 24  Plan of Care expiration date: 12/10/24  Treatment plan discussed with: patient        Short term goals: 4   - Improve walking tolerance to 15 minutes to  "perform household activity -pain withwalking  - Patient to be able to safely negotiate 4-6\" steps (curbs) with minimal pain and no compensatory motions-met  - Patient will be able to perform sit to stand transfers from low chair without increased pain -met  - Patient to reduce pain to 4/10 at its worst to improve activity tolerance -same  - Initiate and advance home exercise program to self manage symptoms.- 50%  - Improve postural awareness and demonstrate self postural correction. -met  - Improve AROM lumbar spine to minimal limitation or better to improve mobility  - Patient to reduce pain at worst to 5/10 at worst in sitting/lifting  to improve activity tolerance.     Long term goals: 8 weeks   - Improve walking tolerance to 30 minutes   - Patient to improve hip range of motion to normal limits -50%  - Patient to improve proximal hip strength to 4+/5 -50%   - Patient to be able to safely negotiate 8\" steps (10) with minimal pain and no compensatory motions  - Patient to reduce pain to 1/10 at its worst to improve QOL and return to function -not met   - Patient will perform squatting with good mechanics and lifting crate from floor to chest without compensation and independently pain-free -50%  - Patient will transfer out of all seated positioning without pain -met  - Patient to exhibit full independence with home exercise program for symptoms relief and prevention -not met  - Patient to achieve lumbar ROM to WFL without increased pain. -not met  - Improve LE strength to 5/5 grossly to improve general mobility  - Improve FOTO score to greater than predicted values   - sleeping without pain and lifting without pain     OBJECTIVE: * denote pain      POSTURE:      Standing: depressed left shoulder  Sitting: rounded lumbar spine         Lumbar AROM limitations:  (* =  Pain) Evaluation Re-evaluation Re-evaluation Re-evaluation Re-evaluation   Date: 10/3/24  11/12/24         Lumbar flexion:            M*  M       "   Lumbar extension:       M dec. pain  M         R side glide:                            Left side glide             R sidebend M  min         L sidebend Min *  min         (S) Severe ,(M) =Moderate, (Min) =Minimal (N) Normal tightness         Mechanical Asessment: pre-test symtpoms include 1/10 pain in standing- incr. Pain with standing flexion and left sidebending                                                                 Repeated Exten in Lying (REIL) 1x10:            No pain   Repeated Flexion in Lying                  Increases hip pain         Myotomes LE Right  Left   L1/L2 Hip flexion         5         5   L3 knee extension         5         5   L4 Ankle dorsiflexion         5         5   L5 Big Toe extension         5         5   S1 Ankle plantar flexion         5         5   s2 Knee FLexion         5         5           DTR's:   Patella R: 2+  Patella L: 2+  Achilles R: 2+  Achilles L: 2+        SPECIAL TESTS:  Slump (sciatic tension): NEG  SLR (disc pathology): NEH  Prone Instability: NEG  but positive ELY's with prone knee flexion at right SI joint and hip extension                     MMT Lower Extremity Evaluation Re-evaluation Re-evaluation Re-evaluation Re-evaluation    0 out of 5  _/5 Right Left Right Left Right Left Right Left Right Left   Date 10/3/2024  11/12/24         Hip Flexion  4/5*  5/5  4+/5  5/5               HIp Abduction  4*/5*   5/5  4/5  5/5               Hip Adduction -- --                   HIp Extension  5/5  5/5  5/5  5/5               Knee Flexion 5/5 5 /5  5  5               Knee Extension 5/5 5/5  5  5               Ankle Dorsiflexion 5/5 5/5  5  5               Ankle Plantarflexion 5/5 5/5  5  5               Ankle Eversion 5/5 5/5  5  5               Ankle Inversion 5/5 5/5  5  5               Score 0=no contraction 1= Trace contraction, 2= Poor FROM antigravity, 3= Fair FROM against gravity, 4 FROM mod resist, 5=FROM max                  Lower Extremity AROM Evaluation  Re-evaluation Re-evaluation Re-evaluation Re-evaluation   Date 10/3/2024  11/12/24           RIght  Left RIght  Left Right Left Right Left Right Left   HIp Flexion 110*  120  120*  120               HIp Extension  10 * 15  10*  15               Hip Abduction 30 * 55  30*  55               Hip IR 0dg  45 45  45  45               HIp ER 0dg 30 30  30  30               HIp IR 90dg 45 45  45  45               HIp ER 90dg 30 30  30  30               Knee Flexion  130  130    130               Knee Extension 0 0  0  0130               *denotes pain                     Flexibility Evaluation Re-evaluation Re-evaluation Re-evaluation Re-evaluation   Date 10/3/2024             Right Left Right  Left Right  Left Right  Left Right  Left   Hamstrings  45 M 45 M  M 50dg  M 50dg               HIp flexors min  min  min  min               Quadriceps  Min  min  min  min               Gastroc -- --  --  --               Gluteals  M * min  M  min               Piriformis  M *  min  M  min                 (S) Severe ,(M) =Moderate, (Min) =Minimal (N) Normal tightness      Palpation/Observation: negative palpation at lateral hip or greater trochanter     Normal Reflexes 2+ ankle and knee     Function:   Neutral spine function:  VERY good with maintaining abdominnal activation and neutral spine with supine stabs, sit to stand  Needs cueing for squatting form and performance     Gait:   11/12/24; slight antalgic gait pattern with decreased weight shifting onto right side.  Single limb stance >20 seconds on the left.  10 seconds on the right no pain or deviation in pelvis but mild hip drop during late terminal stance on the right LE     Transfers:   10/3/2024 : Painful sit to stand out of a seated position  11/12/24;  Good sit to stand     Stairs:  10/3/2024; Painful pushing with step up into right thigh  11/12/24: appears to be grossly normal     Squatting:   10/3/2024 :  poor body mechanics with increased trunk flexion noted.  11/12/24:   "Fair mechanics with difficulty transitioning from floor to stand           Date  11/1/24   11/12/24  10/12/24  10/16/24  10/18/24  10/24/24   Visits # KALEAL  8 3  4  5   6   Manual                PA glides            5'     STM SI joint                                               Neuro                iso ab ball squeeze x20   ball  20   x 20  x20  x20 x20     iso ab hip fallouts  Gtb x20  Rtb x 20 ea   x20  x20  x20 gtb  x20 gtb    iso abd pullovers 3# x20  2# x 15  2# x20   x20 3#20    3# x20     iso ab knee ext  x10 X10 ea   x20  x20 Dead bug modified x10 X15 ea    Iso ab push  x20   X20 FR  x20 X20 fr    clamshells   rtb x20   rtb x20 Rtb x 20   x20   rtbx20 ea X20 rrb    Dead bugs X10         H/s SOS 10\" x 5 ea        Pirifromis SOS 10\" x 5 ea        TE               FIS          Ball x 20                                                               TA                         bridging  x20  x20   x20 with RTB  x20   x20  gtb Gtb x20     band rows/ext ---   x20   gtb x 20 ea  x20        Pail presses 6# [place]     Uni 6# x 15 ea     squatting  x20 ball  ball x 15    x20 ball X20    x20  X20     Gait                                                               Modalities                                               HEP  x15'                              "

## 2024-11-13 ENCOUNTER — OFFICE VISIT (OUTPATIENT)
Dept: OBGYN CLINIC | Facility: CLINIC | Age: 64
End: 2024-11-13
Payer: MEDICARE

## 2024-11-13 ENCOUNTER — APPOINTMENT (OUTPATIENT)
Dept: RADIOLOGY | Facility: CLINIC | Age: 64
End: 2024-11-13
Payer: MEDICARE

## 2024-11-13 VITALS
BODY MASS INDEX: 28.28 KG/M2 | WEIGHT: 176 LBS | HEIGHT: 66 IN | DIASTOLIC BLOOD PRESSURE: 82 MMHG | HEART RATE: 73 BPM | SYSTOLIC BLOOD PRESSURE: 131 MMHG

## 2024-11-13 DIAGNOSIS — M16.11 PRIMARY OSTEOARTHRITIS OF ONE HIP, RIGHT: ICD-10-CM

## 2024-11-13 DIAGNOSIS — M54.9 UPPER BACK PAIN: ICD-10-CM

## 2024-11-13 DIAGNOSIS — M54.9 UPPER BACK PAIN: Primary | ICD-10-CM

## 2024-11-13 DIAGNOSIS — M54.16 CHRONIC RIGHT-SIDED LUMBAR RADICULOPATHY: ICD-10-CM

## 2024-11-13 PROCEDURE — 73502 X-RAY EXAM HIP UNI 2-3 VIEWS: CPT

## 2024-11-13 PROCEDURE — 72070 X-RAY EXAM THORAC SPINE 2VWS: CPT

## 2024-11-13 PROCEDURE — 99214 OFFICE O/P EST MOD 30 MIN: CPT | Performed by: ORTHOPAEDIC SURGERY

## 2024-11-13 NOTE — PROGRESS NOTES
Assessment/Plan:  No diagnosis found.    ***    Subjective:   Miguel Rodgers is a 64 y.o. male who presents ***      Review of Systems      Past Medical History:   Diagnosis Date   • Chest discomfort    • Lung cancer (HCC)        Past Surgical History:   Procedure Laterality Date   • LUNG LOBECTOMY      upper   • TONSILLECTOMY         Family History   Problem Relation Age of Onset   • Lung cancer Mother    • Heart disease Father    • Diabetes Brother    • No Known Problems Son        Social History     Occupational History   • Not on file   Tobacco Use   • Smoking status: Never   • Smokeless tobacco: Never   Vaping Use   • Vaping status: Never Used   Substance and Sexual Activity   • Alcohol use: Never   • Drug use: Never   • Sexual activity: Yes     Partners: Female         Current Outpatient Medications:   •  albuterol (PROVENTIL HFA,VENTOLIN HFA) 90 mcg/act inhaler, Inhale (Patient not taking: Reported on 11/5/2024), Disp: , Rfl:   •  aspirin (ECOTRIN LOW STRENGTH) 81 mg EC tablet, Take 81 mg by mouth daily, Disp: , Rfl:   •  atorvastatin (LIPITOR) 20 mg tablet, Take 20 mg by mouth daily, Disp: , Rfl:   •  meloxicam (MOBIC) 15 mg tablet, 1 BY MOUTH EVERY DAY FOR 14 DAYS THEN ONCE DAILY AS NEEDED FOR PAIN (Patient not taking: Reported on 11/5/2024), Disp: , Rfl:   •  metoprolol tartrate (LOPRESSOR) 25 mg tablet, Take 12.5 mg by mouth 2 (two) times a day, Disp: , Rfl:   •  nitroglycerin (NITROSTAT) 0.4 mg SL tablet, Place 0.4 mg under the tongue every 5 (five) minutes as needed for chest pain, Disp: , Rfl:     No Known Allergies    Objective:  Vitals:    11/13/24 1021   BP: 131/82   Pulse: 73            Ortho Exam    Physical Exam    I have personally reviewed pertinent films in PACS and my interpretation is as follows:  ***      This document was created using speech voice recognition software.   Grammatical errors, random word insertions, pronoun errors, and incomplete sentences are an occasional consequence of  this system due to software limitations, ambient noise, and hardware issues.   Any formal questions or concerns about content, text, or information contained within the body of this dictation should be directly addressed to the provider for clarification.

## 2024-11-13 NOTE — PROGRESS NOTES
Assessment/Plan:  1. Upper back pain  XR spine thoracic 2 vw      2. Primary osteoarthritis of one hip, right  XR hip/pelv 2-3 vws right if performed      3. Chronic right-sided lumbar radiculopathy            Miguel has right-sided hip pain consistent with mild to moderate osteoarthritis.  I do think this is the primary cause of his anterior hip pain.  It is not significant advanced compared to 1 year ago but has not responded to conservative measures of physical therapy.  While he does have some back pain I do not think he has clear lumbar radiculopathy on my exam today.  We could consider MRI of the lumbar spine if clinically indicated in the future.  His pain in the upper back appears to be related to some degenerative changes and osteoarthritis as well.  Considerations could consider follow-up with pain management if the pain continues in this location.    Subjective:   Miguel Rodgers is a 64 y.o. male who presents to the office for follow-up for lower back and right hip pain.  He has a history of right hip osteoarthritis and a history of lumbar radiculopathy.  He has undergone physical therapy for lower back pain and hip pain and feels like it is not significant improved his discomfort.  He is continue to have pain rating into the right groin and right leg.  He also has pain in his lower back rating up into his thoracic region.  No acute trauma dislocation.      Review of Systems   Constitutional:  Negative for chills, fever and unexpected weight change.   HENT:  Negative for hearing loss, nosebleeds and sore throat.    Eyes:  Negative for pain, redness and visual disturbance.   Respiratory:  Negative for cough, shortness of breath and wheezing.    Cardiovascular:  Negative for chest pain, palpitations and leg swelling.   Gastrointestinal:  Negative for abdominal pain, nausea and vomiting.   Endocrine: Negative for polyphagia and polyuria.   Genitourinary:  Negative for dysuria and hematuria.    Musculoskeletal:         See HPI   Skin:  Negative for rash and wound.   Neurological:  Negative for dizziness, numbness and headaches.   Psychiatric/Behavioral:  Negative for decreased concentration and suicidal ideas. The patient is not nervous/anxious.          Past Medical History:   Diagnosis Date    Chest discomfort     Lung cancer (HCC)        Past Surgical History:   Procedure Laterality Date    LUNG LOBECTOMY      upper    TONSILLECTOMY         Family History   Problem Relation Age of Onset    Lung cancer Mother     Heart disease Father     Diabetes Brother     No Known Problems Son        Social History     Occupational History    Not on file   Tobacco Use    Smoking status: Never    Smokeless tobacco: Never   Vaping Use    Vaping status: Never Used   Substance and Sexual Activity    Alcohol use: Never    Drug use: Never    Sexual activity: Yes     Partners: Female         Current Outpatient Medications:     albuterol (PROVENTIL HFA,VENTOLIN HFA) 90 mcg/act inhaler, Inhale (Patient not taking: Reported on 11/5/2024), Disp: , Rfl:     aspirin (ECOTRIN LOW STRENGTH) 81 mg EC tablet, Take 81 mg by mouth daily, Disp: , Rfl:     atorvastatin (LIPITOR) 20 mg tablet, Take 20 mg by mouth daily, Disp: , Rfl:     meloxicam (MOBIC) 15 mg tablet, 1 BY MOUTH EVERY DAY FOR 14 DAYS THEN ONCE DAILY AS NEEDED FOR PAIN (Patient not taking: Reported on 11/5/2024), Disp: , Rfl:     metoprolol tartrate (LOPRESSOR) 25 mg tablet, Take 12.5 mg by mouth 2 (two) times a day, Disp: , Rfl:     nitroglycerin (NITROSTAT) 0.4 mg SL tablet, Place 0.4 mg under the tongue every 5 (five) minutes as needed for chest pain, Disp: , Rfl:     No Known Allergies    Objective:  Vitals:    11/13/24 1021   BP: 131/82   Pulse: 73            Right Hip Exam     Tenderness   The patient is experiencing tenderness in the anterior.    Range of Motion   External rotation:  normal   Internal rotation:  20 abnormal     Other   Erythema: absent  Sensation:  normal  Pulse: present      Back Exam     Tenderness   The patient is experiencing tenderness in the lumbar and thoracic.    Range of Motion   Extension:  normal   Flexion:  normal     Muscle Strength   Right Quadriceps:  5/5   Left Quadriceps:  5/5   Right Hamstrings:  5/5   Left Hamstrings:  5/5     Tests   Straight leg raise right: positive at 90 deg    Reflexes   Patellar:  normal    Other   Sensation: normal  Gait: normal             Physical Exam  Vitals reviewed.   Constitutional:       Appearance: He is well-developed.   HENT:      Head: Normocephalic and atraumatic.   Eyes:      Conjunctiva/sclera: Conjunctivae normal.      Pupils: Pupils are equal, round, and reactive to light.   Cardiovascular:      Rate and Rhythm: Normal rate.      Pulses: Normal pulses.   Pulmonary:      Effort: Pulmonary effort is normal. No respiratory distress.   Musculoskeletal:      Cervical back: Normal range of motion and neck supple.      Lumbar back: Positive right straight leg raise test.      Comments: As noted in HPI   Skin:     General: Skin is warm and dry.   Neurological:      General: No focal deficit present.      Mental Status: He is alert and oriented to person, place, and time.   Psychiatric:         Mood and Affect: Mood normal.         Behavior: Behavior normal.         I have personally reviewed pertinent films in PACS and my interpretation is as follows:  X-ray of the right hip demonstrates moderate hip osteoarthritis.  No acute fracture.  No abnormality in the left hip.      This document was created using speech voice recognition software.   Grammatical errors, random word insertions, pronoun errors, and incomplete sentences are an occasional consequence of this system due to software limitations, ambient noise, and hardware issues.   Any formal questions or concerns about content, text, or information contained within the body of this dictation should be directly addressed to the provider for clarification.

## 2024-11-14 ENCOUNTER — PROCEDURE VISIT (OUTPATIENT)
Dept: OBGYN CLINIC | Facility: CLINIC | Age: 64
End: 2024-11-14
Payer: MEDICARE

## 2024-11-14 VITALS
DIASTOLIC BLOOD PRESSURE: 82 MMHG | SYSTOLIC BLOOD PRESSURE: 131 MMHG | BODY MASS INDEX: 28.28 KG/M2 | HEIGHT: 66 IN | HEART RATE: 73 BPM | WEIGHT: 176 LBS

## 2024-11-14 DIAGNOSIS — M16.11 PRIMARY OSTEOARTHRITIS OF ONE HIP, RIGHT: Primary | ICD-10-CM

## 2024-11-14 PROCEDURE — 20611 DRAIN/INJ JOINT/BURSA W/US: CPT | Performed by: ORTHOPAEDIC SURGERY

## 2024-11-14 PROCEDURE — 99213 OFFICE O/P EST LOW 20 MIN: CPT | Performed by: ORTHOPAEDIC SURGERY

## 2024-11-14 RX ORDER — TRIAMCINOLONE ACETONIDE 40 MG/ML
80 INJECTION, SUSPENSION INTRA-ARTICULAR; INTRAMUSCULAR
Status: COMPLETED | OUTPATIENT
Start: 2024-11-14 | End: 2024-11-14

## 2024-11-14 RX ORDER — LIDOCAINE HYDROCHLORIDE 10 MG/ML
3 INJECTION, SOLUTION INFILTRATION; PERINEURAL
Status: COMPLETED | OUTPATIENT
Start: 2024-11-14 | End: 2024-11-14

## 2024-11-14 RX ADMIN — LIDOCAINE HYDROCHLORIDE 3 ML: 10 INJECTION, SOLUTION INFILTRATION; PERINEURAL at 09:30

## 2024-11-14 RX ADMIN — TRIAMCINOLONE ACETONIDE 80 MG: 40 INJECTION, SUSPENSION INTRA-ARTICULAR; INTRAMUSCULAR at 09:30

## 2024-11-14 NOTE — PROGRESS NOTES
Assessment/Plan:  1. Primary osteoarthritis of one hip, right  Large joint arthrocentesis: R hip joint          Miguel tolerated an ultrasound-guided right hip injection today.  He did have a fair amount of inflammatory fluid at the hip joint indicative of a small effusion.  This is likely confirming the source of his pain and he tolerated the injection well with good relief afterwards.  I discussed with him if he continues to have lower back pain that is separate from the anterior hip pain then he may require an MRI of the lumbar spine.  Follow-up with me as needed.  He will update me on his pain in the next 2 to 3 weeks.    Large joint arthrocentesis: R hip joint  Universal Protocol:  Consent: Verbal consent obtained.  Risks and benefits: risks, benefits and alternatives were discussed  Consent given by: patient  Patient understanding: patient states understanding of the procedure being performed  Site marked: the operative site was marked  Radiology Images displayed and confirmed. If images not available, report reviewed: imaging studies available  Supporting Documentation  Indications: pain   Procedure Details  Location: hip - R hip joint  Preparation: Patient was prepped and draped in the usual sterile fashion  Needle size: 20 G  Ultrasound guidance: yes  Approach: anterior  Medications administered: 3 mL lidocaine 1 %; 80 mg triamcinolone acetonide 40 mg/mL    Patient tolerance: patient tolerated the procedure well with no immediate complications  Dressing:  Sterile dressing applied          Subjective:   Miguel Rodgers is a 64 y.o. male who presents to the office for a scheduled ultrasound-guided hip injection in the right hip.  He was seen in the office yesterday with ongoing hip pain consistent with hip osteoarthritis.  He had increased symptoms of lower back and hip pain that were unclear which one was causing him discomfort and he underwent physical therapy for 6 weeks.  At yesterday's visit more of his  pain was coming from the anterior portion of the hip with discomfort with internal rotation and I recommended ultrasound-guided hip injection to delineate between the pain.  Today he has aching throbbing intermittent pain in the anterior portion of the hip as well as some discomfort in the lower back.      Review of Systems   Constitutional:  Negative for chills, fever and unexpected weight change.   HENT:  Negative for hearing loss, nosebleeds and sore throat.    Eyes:  Negative for pain, redness and visual disturbance.   Respiratory:  Negative for cough, shortness of breath and wheezing.    Cardiovascular:  Negative for chest pain, palpitations and leg swelling.   Gastrointestinal:  Negative for abdominal pain, nausea and vomiting.   Endocrine: Negative for polyphagia and polyuria.   Genitourinary:  Negative for dysuria and hematuria.   Musculoskeletal:         See HPI   Skin:  Negative for rash and wound.   Neurological:  Negative for dizziness, numbness and headaches.   Psychiatric/Behavioral:  Negative for decreased concentration and suicidal ideas. The patient is not nervous/anxious.          Past Medical History:   Diagnosis Date    Chest discomfort     Lung cancer (HCC)        Past Surgical History:   Procedure Laterality Date    LUNG LOBECTOMY      upper    TONSILLECTOMY         Family History   Problem Relation Age of Onset    Lung cancer Mother     Heart disease Father     Diabetes Brother     No Known Problems Son        Social History     Occupational History    Not on file   Tobacco Use    Smoking status: Never    Smokeless tobacco: Never   Vaping Use    Vaping status: Never Used   Substance and Sexual Activity    Alcohol use: Never    Drug use: Never    Sexual activity: Yes     Partners: Female         Current Outpatient Medications:     albuterol (PROVENTIL HFA,VENTOLIN HFA) 90 mcg/act inhaler, Inhale (Patient not taking: Reported on 11/5/2024), Disp: , Rfl:     aspirin (ECOTRIN LOW STRENGTH) 81 mg  EC tablet, Take 81 mg by mouth daily, Disp: , Rfl:     atorvastatin (LIPITOR) 20 mg tablet, Take 20 mg by mouth daily, Disp: , Rfl:     meloxicam (MOBIC) 15 mg tablet, 1 BY MOUTH EVERY DAY FOR 14 DAYS THEN ONCE DAILY AS NEEDED FOR PAIN (Patient not taking: Reported on 11/5/2024), Disp: , Rfl:     metoprolol tartrate (LOPRESSOR) 25 mg tablet, Take 12.5 mg by mouth 2 (two) times a day, Disp: , Rfl:     nitroglycerin (NITROSTAT) 0.4 mg SL tablet, Place 0.4 mg under the tongue every 5 (five) minutes as needed for chest pain, Disp: , Rfl:     No Known Allergies    Objective:  Vitals:    11/14/24 0934   BP: 131/82   Pulse: 73            Right Hip Exam     Tenderness   The patient is experiencing tenderness in the anterior.    Range of Motion   External rotation:  30 abnormal   Internal rotation:  15 abnormal     Other   Erythema: absent  Sensation: normal  Pulse: present            Physical Exam  Vitals reviewed.   Constitutional:       Appearance: He is well-developed.   HENT:      Head: Normocephalic and atraumatic.   Eyes:      Conjunctiva/sclera: Conjunctivae normal.      Pupils: Pupils are equal, round, and reactive to light.   Cardiovascular:      Rate and Rhythm: Normal rate.      Pulses: Normal pulses.   Pulmonary:      Effort: Pulmonary effort is normal. No respiratory distress.   Musculoskeletal:      Cervical back: Normal range of motion and neck supple.      Comments: As noted in HPI   Skin:     General: Skin is warm and dry.   Neurological:      General: No focal deficit present.      Mental Status: He is alert and oriented to person, place, and time.   Psychiatric:         Mood and Affect: Mood normal.         Behavior: Behavior normal.               This document was created using speech voice recognition software.   Grammatical errors, random word insertions, pronoun errors, and incomplete sentences are an occasional consequence of this system due to software limitations, ambient noise, and hardware  issues.   Any formal questions or concerns about content, text, or information contained within the body of this dictation should be directly addressed to the provider for clarification.

## 2024-11-15 ENCOUNTER — OFFICE VISIT (OUTPATIENT)
Dept: PHYSICAL THERAPY | Facility: CLINIC | Age: 64
End: 2024-11-15
Payer: MEDICARE

## 2024-11-15 DIAGNOSIS — M54.16 CHRONIC RIGHT-SIDED LUMBAR RADICULOPATHY: Primary | ICD-10-CM

## 2024-11-15 DIAGNOSIS — M54.16 LUMBAR RADICULOPATHY: ICD-10-CM

## 2024-11-15 PROCEDURE — 97530 THERAPEUTIC ACTIVITIES: CPT

## 2024-11-15 PROCEDURE — 97140 MANUAL THERAPY 1/> REGIONS: CPT

## 2024-11-15 PROCEDURE — 97112 NEUROMUSCULAR REEDUCATION: CPT

## 2024-11-15 NOTE — PROGRESS NOTES
"Daily Note     Today's date: 11/15/2024  Patient name: Miguel Rodgers  : 1960  MRN: [id number]  Referring provider: Suresh Laird DO  Dx:   Encounter Diagnosis     ICD-10-CM    1. Chronic right-sided lumbar radiculopathy  M54.16       2. Lumbar radiculopathy  M54.16                      Subjective: I got a shot in the ant. Hip Wednesday and I am feeling relief already       Objective: See treatment diary below      Assessment: Tolerated treatment well. Patient would benefit from continued PT.  Reduced workload today due to       Plan: Continue per plan of care.         Date  11/1/24   11/12/24  11/15/24  10/16/24  10/18/24  10/24/24   Visits # EVAL  8 3  4  5   6   Manual               Manual stretching     Glutes   piriformis      5'                                                   Neuro                iso ab ball squeeze x20   ball  20   x 20  x20  x20 x20     iso ab hip fallouts  Gtb x20  Rtb x 20 ea   x20  x20  x20 gtb  x20 gtb    iso abd pullovers 3# x20  2# x 15  2# x20   x20 3#20    3# x20     iso ab knee ext  x10 X10 ea   x20  x20 Dead bug modified x10 X15 ea    Iso ab push  x20  x20 X20 FR  x20 X20 fr    clamshells   rtb x20   rtb x20 Rtb x 20   x20   rtbx20 ea X20 rrb    Dead bugs X10   x10      H/s SOS 10\" x 5 ea  10\" x5      Pirifromis SOS 10\" x 5 ea  PT manual       TE               FIS  x20  x20      Ball x 20                                                               TA                         bridging  x20  x20   x20 with RTB  x20   x20  gtb Gtb x20     band rows/ext ---   x20   gtb x 20 ea  x20        Pail presses 6# [place]     Uni 6# x 15 ea     squatting  x20 ball  ball x 15    hold  X20    x20  X20     Gait                                                               Modalities                                               HEP  x15'                                "

## 2024-11-19 ENCOUNTER — TELEPHONE (OUTPATIENT)
Dept: OBGYN CLINIC | Facility: MEDICAL CENTER | Age: 64
End: 2024-11-19

## 2024-11-19 NOTE — TELEPHONE ENCOUNTER
Caller: Miguel    Doctor: Dr Laird     Reason for call: The patient had an injection on 11/14. He is calling due to being in pain. Pain level 6   The injection helped for one day.       Call back#: [phone]       Primary osteoarthritis of one hip, right  Large joint arthrocentesis: R hip joint   Given 11/14.    He would like Dr Laird to please put in for a MRI. But the pain is bad at this point.    Thank you

## 2024-11-20 DIAGNOSIS — M54.16 CHRONIC RIGHT-SIDED LUMBAR RADICULOPATHY: Primary | ICD-10-CM

## 2024-12-03 ENCOUNTER — HOSPITAL ENCOUNTER (OUTPATIENT)
Dept: RADIOLOGY | Facility: HOSPITAL | Age: 64
Discharge: HOME | End: 2024-12-03
Attending: ORTHOPAEDIC SURGERY
Payer: MEDICARE

## 2024-12-03 DIAGNOSIS — M54.16 CHRONIC RIGHT-SIDED LUMBAR RADICULOPATHY: ICD-10-CM

## 2024-12-03 PROCEDURE — 72148 MRI LUMBAR SPINE W/O DYE: CPT

## 2024-12-04 ENCOUNTER — TELEPHONE (OUTPATIENT)
Dept: OBGYN CLINIC | Facility: CLINIC | Age: 64
End: 2024-12-04

## 2024-12-05 ENCOUNTER — RESULTS FOLLOW-UP (OUTPATIENT)
Dept: OBGYN CLINIC | Facility: CLINIC | Age: 64
End: 2024-12-05

## 2024-12-05 DIAGNOSIS — R93.7 ABNORMAL MRI, SPINE: ICD-10-CM

## 2024-12-05 DIAGNOSIS — M54.16 CHRONIC RIGHT-SIDED LUMBAR RADICULOPATHY: Primary | ICD-10-CM

## 2024-12-05 NOTE — TELEPHONE ENCOUNTER
Patient is scheduled for 12/11 at 5pm  ----- Message from Suresh Laird DO sent at 12/5/2024 12:10 PM EST -----  Spoke with patient. MRI lumbar spine w/wo contrast ordered. Patient aware. We will help to schedule

## 2024-12-06 ENCOUNTER — TELEPHONE (OUTPATIENT)
Age: 64
End: 2024-12-06

## 2024-12-06 NOTE — TELEPHONE ENCOUNTER
Caller: Muriel, spouse     Doctor: Eitan     Reason for call: Returning missed call from [place]    Call back#: [phone]

## 2024-12-06 NOTE — TELEPHONE ENCOUNTER
Caller: Muriel    Doctor: Etian     Reason for call: Patient wife states she is returning call from Dr Laird office about an MRI approval as well as about blood work. Please return call.     Call back#: [phone]

## 2024-12-06 NOTE — TELEPHONE ENCOUNTER
Caller: Patient    Doctor/Office: Eitan    Call regarding :  Returned  call    Call was transferred to: [facility]

## 2024-12-17 ENCOUNTER — TELEPHONE (OUTPATIENT)
Age: 64
End: 2024-12-17

## 2024-12-17 DIAGNOSIS — M48.061 SPINAL STENOSIS OF LUMBAR REGION WITHOUT NEUROGENIC CLAUDICATION: ICD-10-CM

## 2024-12-17 DIAGNOSIS — M54.16 CHRONIC RIGHT-SIDED LUMBAR RADICULOPATHY: Primary | ICD-10-CM

## 2024-12-17 NOTE — TELEPHONE ENCOUNTER
Caller: Miguel    Doctor: Eitan    Reason for call: Went for his MRI 12/14 and it was uploaded into his chart today. He would like to know what the next step will be - if he needs an appt with you or pain mgmt    Call back#: [phone]

## 2024-12-19 ENCOUNTER — CONSULT (OUTPATIENT)
Dept: PAIN MEDICINE | Facility: CLINIC | Age: 64
End: 2024-12-19
Payer: MEDICARE

## 2024-12-19 VITALS
BODY MASS INDEX: 27.97 KG/M2 | HEIGHT: 66 IN | SYSTOLIC BLOOD PRESSURE: 116 MMHG | WEIGHT: 174 LBS | DIASTOLIC BLOOD PRESSURE: 77 MMHG | HEART RATE: 64 BPM

## 2024-12-19 DIAGNOSIS — M54.9 MID BACK PAIN: ICD-10-CM

## 2024-12-19 DIAGNOSIS — M47.816 LUMBAR SPONDYLOSIS: Primary | ICD-10-CM

## 2024-12-19 DIAGNOSIS — M54.16 CHRONIC RIGHT-SIDED LUMBAR RADICULOPATHY: ICD-10-CM

## 2024-12-19 DIAGNOSIS — M48.061 SPINAL STENOSIS OF LUMBAR REGION WITHOUT NEUROGENIC CLAUDICATION: ICD-10-CM

## 2024-12-19 DIAGNOSIS — C34.12 MALIGNANT NEOPLASM OF UPPER LOBE, LEFT BRONCHUS OR LUNG (HCC): ICD-10-CM

## 2024-12-19 PROCEDURE — 99204 OFFICE O/P NEW MOD 45 MIN: CPT | Performed by: STUDENT IN AN ORGANIZED HEALTH CARE EDUCATION/TRAINING PROGRAM

## 2024-12-19 RX ORDER — GABAPENTIN 300 MG/1
300 CAPSULE ORAL 2 TIMES DAILY
Qty: 60 CAPSULE | Refills: 0 | Status: SHIPPED | OUTPATIENT
Start: 2024-12-19 | End: 2025-01-18

## 2024-12-19 RX ORDER — CELECOXIB 200 MG/1
200 CAPSULE ORAL 2 TIMES DAILY PRN
Qty: 60 CAPSULE | Refills: 0 | Status: SHIPPED | OUTPATIENT
Start: 2024-12-19 | End: 2025-01-18

## 2024-12-19 NOTE — H&P (VIEW-ONLY)
Assessment:  1. Lumbar spondylosis    2. Chronic right-sided lumbar radiculopathy    3. Spinal stenosis of lumbar region without neurogenic claudication    4. Malignant neoplasm of upper lobe, left bronchus or lung (HCC)    5. Mid back pain    Patient is a pleasant six 4-year-old male presenting with back pain along with hip and right arm pain.  Over the past month the intensity of the pain has been moderate to severe and he rates pain currently as a 8 out of 10 on numeric rating scale.  The pain does interfere with his daily activities.  The pain occurs constantly.  There is no pattern when symptoms are better or worse.  He describes pain as shooting, throbbing, dull aching with no associated weakness and he does not use any assistive devices.  Activity increases pain include standing, bending, sitting, walking, coughing, sneezing.  Patient does have MRI of the thoracic and lumbar spine with MRI of the thoracic spine showing small disc protrusions at T7-T8, T8-T9 and T9-T10.  MRI lumbar spine with multilevel degenerative disease Worse at L2-L3, L3-L4 and L4-L5 where he also has bilateral neuroforaminal narrowing as well.  Patient had no prior injections or surgeries.  Patient does have a malignant neoplasm in the lungs but no evidence of metastatic disease in his thoracic or lumbar spine.  Prior pain treatments include physical therapy which provides no relief.  Patient has tried physical therapy with no relief.  He tried heat and ice with no relief.  Patient takes aspirin 81 mg.  Patient was put on tramadol by his oncologist but he states this did not help.  He is also on Tylenol.  He has not trialed any muscle relaxants or nerve pain medications.  Of note patient did get a right hip injection from Dr. Laird and he states this was somewhat helpful but he still having pain going down his right leg.    Of note patient does have a history of 3.3 cm invasive adenocarcinoma of the left lung and is status postsurgery,  chemotherapy and radiation and is now in surveillance.  Patient has no evidence of metastatic disease in his thoracic or lumbar spine.  On examination patient with tenderness to palpation of bilateral lumbar paraspinal muscles with positive straight leg raise on the right.  Patient symptoms likely secondary to his known multilevel degenerative disc disease in his lumbar spine with foraminal narrowing at multiple levels with symptoms most concordant to his foraminal narrowing at L4-L5.  Given this would like to schedule patient for L4-L5 lumbar epidural steroid injection fluoroscopic guidance.  Patient counseled risk and benefits of injection therapy like to proceed.  For symptomatically.  Gabapentin 300 mg twice daily.  Titration schedule provided.  Lastly will also provide a prescription for Celebrex 200 mg twice daily as needed.  Patient amenable to this plan.    Plan:  Start celebrex 200 mg BID prn   Start gabapentin 300 mg BID; titration schedule provided  Continue apap 1g TID prn   Schedule for L4-L5 LESI under fluoroscopic guidance   Continue HEP, Treat Your Own Back By Lisandra   No orders of the defined types were placed in this encounter.      New Medications Ordered This Visit   Medications   • celecoxib (CeleBREX) 200 mg capsule     Sig: Take 1 capsule (200 mg total) by mouth 2 (two) times a day as needed for mild pain     Dispense:  60 capsule     Refill:  0   • gabapentin (NEURONTIN) 300 mg capsule     Sig: Take 1 capsule (300 mg total) by mouth 2 (two) times a day Start with one tablet nightly for 5 nights and then increase to twice daily.     Dispense:  60 capsule     Refill:  0       My impressions and treatment recommendations were discussed in detail with the patient, who verbalized understanding and had no further questions.    Complete risks and benefits including bleeding, infection, tissue reaction, nerve injury and allergic reaction were discussed. The approach was demonstrated using models  and literature was provided. Verbal and written consent was obtained.    Follow-up is planned in four weeks time or sooner as warranted.  Discharge instructions were provided. I personally saw and examined the patient and I agree with the above discussed plan of care.    History of Present Illness:    Miguel Rodgers is a 64 y.o. male who presents to [facility] for initial evaluation of the above stated pain complaints. The patient has a past medical and chronic pain history as outlined in the assessment section. He was referred by Suresh Laird,   [address], [address]  [place] [address].    Patient is a pleasant six 4-year-old male presenting with back pain along with hip and right arm pain.  Over the past month the intensity of the pain has been moderate to severe and he rates pain currently as a 8 out of 10 on numeric rating scale.  The pain does interfere with his daily activities.  The pain occurs constantly.  There is no pattern when symptoms are better or worse.  He describes pain as shooting, throbbing, dull aching with no associated weakness and he does not use any assistive devices.  Activity increases pain include standing, bending, sitting, walking, coughing, sneezing.  Patient does have MRI of the thoracic and lumbar spine with MRI of the thoracic spine showing small disc protrusions at T7-T8, T8-T9 and T9-T10.  MRI lumbar spine with multilevel degenerative disease Worse at L2-L3, L3-L4 and L4-L5 where he also has bilateral neuroforaminal narrowing as well.  Patient had no prior injections or surgeries.  Patient does have a malignant neoplasm in the lungs but no evidence of metastatic disease in his thoracic or lumbar spine.  Prior pain treatments include physical therapy which provides no relief.  Patient has tried physical therapy with no relief.  He tried heat and ice with no relief.  Patient takes aspirin 81 mg.  Patient was put on  tramadol by his oncologist but he states this did not help.  He is also on Tylenol.  He has not trialed any muscle relaxants or nerve pain medications.  Of note patient did get a right hip injection from Dr. Laird and he states this was somewhat helpful but he still having pain going down his right leg.    Review of Systems:    Review of Systems   Constitutional:  Negative for chills and fatigue.   HENT:  Negative for ear pain, mouth sores and sinus pressure.    Eyes:  Negative for pain, redness and visual disturbance.   Respiratory:  Negative for shortness of breath and wheezing.    Cardiovascular:  Negative for chest pain and palpitations.   Gastrointestinal:  Negative for abdominal pain and nausea.   Endocrine: Negative for polyphagia.   Musculoskeletal:  Positive for back pain and gait problem. Negative for arthralgias and neck pain.        Decreased ROM, joint and muscle pain   Skin:  Negative for wound.   Neurological:  Positive for weakness, numbness and headaches. Negative for seizures.   Psychiatric/Behavioral:  Positive for decreased concentration, dysphoric mood and sleep disturbance. The patient is nervous/anxious.            Past Medical History:   Diagnosis Date   • Chest discomfort    • Lung cancer (HCC)        Past Surgical History:   Procedure Laterality Date   • LUNG LOBECTOMY      upper   • TONSILLECTOMY         Family History   Problem Relation Age of Onset   • Lung cancer Mother    • Heart disease Father    • Diabetes Brother    • No Known Problems Son        Social History     Occupational History   • Not on file   Tobacco Use   • Smoking status: Never   • Smokeless tobacco: Never   Vaping Use   • Vaping status: Never Used   Substance and Sexual Activity   • Alcohol use: Never   • Drug use: Never   • Sexual activity: Yes     Partners: Female         Current Outpatient Medications:   •  albuterol (PROVENTIL HFA,VENTOLIN HFA) 90 mcg/act inhaler, Inhale, Disp: , Rfl:   •  celecoxib (CeleBREX) 200  "mg capsule, Take 1 capsule (200 mg total) by mouth 2 (two) times a day as needed for mild pain, Disp: 60 capsule, Rfl: 0  •  gabapentin (NEURONTIN) 300 mg capsule, Take 1 capsule (300 mg total) by mouth 2 (two) times a day Start with one tablet nightly for 5 nights and then increase to twice daily., Disp: 60 capsule, Rfl: 0  •  nitroglycerin (NITROSTAT) 0.4 mg SL tablet, Place 0.4 mg under the tongue every 5 (five) minutes as needed for chest pain, Disp: , Rfl:   •  aspirin (ECOTRIN LOW STRENGTH) 81 mg EC tablet, Take 81 mg by mouth daily, Disp: , Rfl:   •  atorvastatin (LIPITOR) 20 mg tablet, Take 20 mg by mouth daily, Disp: , Rfl:   •  meloxicam (MOBIC) 15 mg tablet, 1 BY MOUTH EVERY DAY FOR 14 DAYS THEN ONCE DAILY AS NEEDED FOR PAIN (Patient not taking: Reported on 12/19/2024), Disp: , Rfl:   •  metoprolol tartrate (LOPRESSOR) 25 mg tablet, Take 12.5 mg by mouth 2 (two) times a day, Disp: , Rfl:     No Known Allergies    Physical Exam:    /77 (Patient Position: Sitting, Cuff Size: Adult)   Pulse 64   Ht 5' 6\" (1.676 m)   Wt 78.9 kg (174 lb)   BMI 28.08 kg/m²     Constitutional: normal, well developed, well nourished, alert, in no distress and non-toxic and no overt pain behavior.  Eyes: anicteric  HEENT: grossly intact  Neck: supple, symmetric, trachea midline and no masses   Pulmonary:even and unlabored  Cardiovascular:No edema or pitting edema present  Skin:Normal without rashes or lesions and well hydrated  Psychiatric:Mood and affect appropriate  Neurologic:Cranial Nerves II-XII grossly intact  Musculoskeletal:normal gait. TTP in midline lumbar spine and bilateral lumbar paraspinal muscles. +SLR on the right.     Imaging     [facility]  Outside Information  Results  MR lumbosacral spine canal w wo contrast (Order [id number])      suggestion  Information displayed in this report may not trend or trigger automated decision support.     MR lumbosacral spine canal w wo contrast  Order: " [id number]  Impression    1.  Multilevel degenerative changes of the lumbar spine most prominent at L2-L3, L3-L4, and L4-L5 where there is moderate bilateral neural foraminal narrowing.  2.  No abnormal enhancement.  Narrative    MRI LUMBAR SPINE WITHOUT AND WITH CONTRAST    Indication:  Back pain.    Comparison: None.    Technique: Multiplanar multisequence MR imaging of the lumbar spine without and with contrast. A total of 16 mL Dotarem administered intravenously without incident.    Findings:    Numbering: Last fully formed disc space is designated L5-S1.  Postsurgical Changes: None.  Bones: Vertebral body heights are maintained. There is grade 1 anterolisthesis at L4-L5. Alignment otherwise normal. Marrow signal is normal.  Intervertebral Discs: Disc desiccation mild disc height loss at L2-L3, L3-L4, L4-L5.  Conus Medullaris: Normal in position. Terminates at the T12-L1 level.  Visualized Spinal Cord: Normal.  Soft Tissues: The visualized soft tissues are normal.  Visualized Abdomen: Normal.  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  Postcontrast Images: No abnormal enhancement.    T12-L1: No disc bulge or herniation. Facet joints are normal. No spinal canal stenosis. No right neural foraminal stenosis. No left neural foraminal stenosis.    L1-L2: No disc bulge or herniation. Facet joints are normal. No spinal canal stenosis. No right neural foraminal stenosis. No left neural foraminal stenosis.    L2-L3: Mild disc bulge. Mild bilateral facet degenerative changes. No spinal canal stenosis. Moderate right foraminal stenosis. Moderate left foraminal stenosis.    L3-L4: Mild disc bulge. Mild bilateral facet degenerative changes. No spinal canal stenosis. Moderate right foraminal stenosis. Moderate left foraminal stenosis.    L4-L5: Mild disc bulge. Moderate bilateral facet degenerative changes. No spinal canal stenosis. Moderate right foraminal stenosis. Mild left foraminal stenosis.    L5-S1:  No disc bulge or herniation. Mild bilateral facet degenerative changes. No spinal canal stenosis. No right neural foraminal stenosis. No left neural foraminal stenosis.  Exam End: 12/14/24  6:19 PM Last Resulted: 12/16/24  9:24 AM   Received From: [facility]  Result Received: 12/19/24  7:49 AM    View Encounter        Received Information            No orders to display       No orders of the defined types were placed in this encounter.

## 2024-12-19 NOTE — PROGRESS NOTES
Assessment:  1. Lumbar spondylosis    2. Chronic right-sided lumbar radiculopathy    3. Spinal stenosis of lumbar region without neurogenic claudication    4. Malignant neoplasm of upper lobe, left bronchus or lung (HCC)    5. Mid back pain    Patient is a pleasant six 4-year-old male presenting with back pain along with hip and right arm pain.  Over the past month the intensity of the pain has been moderate to severe and he rates pain currently as a 8 out of 10 on numeric rating scale.  The pain does interfere with his daily activities.  The pain occurs constantly.  There is no pattern when symptoms are better or worse.  He describes pain as shooting, throbbing, dull aching with no associated weakness and he does not use any assistive devices.  Activity increases pain include standing, bending, sitting, walking, coughing, sneezing.  Patient does have MRI of the thoracic and lumbar spine with MRI of the thoracic spine showing small disc protrusions at T7-T8, T8-T9 and T9-T10.  MRI lumbar spine with multilevel degenerative disease Worse at L2-L3, L3-L4 and L4-L5 where he also has bilateral neuroforaminal narrowing as well.  Patient had no prior injections or surgeries.  Patient does have a malignant neoplasm in the lungs but no evidence of metastatic disease in his thoracic or lumbar spine.  Prior pain treatments include physical therapy which provides no relief.  Patient has tried physical therapy with no relief.  He tried heat and ice with no relief.  Patient takes aspirin 81 mg.  Patient was put on tramadol by his oncologist but he states this did not help.  He is also on Tylenol.  He has not trialed any muscle relaxants or nerve pain medications.  Of note patient did get a right hip injection from Dr. Laird and he states this was somewhat helpful but he still having pain going down his right leg.    Of note patient does have a history of 3.3 cm invasive adenocarcinoma of the left lung and is status postsurgery,  chemotherapy and radiation and is now in surveillance.  Patient has no evidence of metastatic disease in his thoracic or lumbar spine.  On examination patient with tenderness to palpation of bilateral lumbar paraspinal muscles with positive straight leg raise on the right.  Patient symptoms likely secondary to his known multilevel degenerative disc disease in his lumbar spine with foraminal narrowing at multiple levels with symptoms most concordant to his foraminal narrowing at L4-L5.  Given this would like to schedule patient for L4-L5 lumbar epidural steroid injection fluoroscopic guidance.  Patient counseled risk and benefits of injection therapy like to proceed.  For symptomatically.  Gabapentin 300 mg twice daily.  Titration schedule provided.  Lastly will also provide a prescription for Celebrex 200 mg twice daily as needed.  Patient amenable to this plan.    Plan:  Start celebrex 200 mg BID prn   Start gabapentin 300 mg BID; titration schedule provided  Continue apap 1g TID prn   Schedule for L4-L5 LESI under fluoroscopic guidance   Continue HEP, Treat Your Own Back By Lisandra   No orders of the defined types were placed in this encounter.      New Medications Ordered This Visit   Medications   • celecoxib (CeleBREX) 200 mg capsule     Sig: Take 1 capsule (200 mg total) by mouth 2 (two) times a day as needed for mild pain     Dispense:  60 capsule     Refill:  0   • gabapentin (NEURONTIN) 300 mg capsule     Sig: Take 1 capsule (300 mg total) by mouth 2 (two) times a day Start with one tablet nightly for 5 nights and then increase to twice daily.     Dispense:  60 capsule     Refill:  0       My impressions and treatment recommendations were discussed in detail with the patient, who verbalized understanding and had no further questions.    Complete risks and benefits including bleeding, infection, tissue reaction, nerve injury and allergic reaction were discussed. The approach was demonstrated using models  and literature was provided. Verbal and written consent was obtained.    Follow-up is planned in four weeks time or sooner as warranted.  Discharge instructions were provided. I personally saw and examined the patient and I agree with the above discussed plan of care.    History of Present Illness:    Miguel Rodgers is a 64 y.o. male who presents to [facility] for initial evaluation of the above stated pain complaints. The patient has a past medical and chronic pain history as outlined in the assessment section. He was referred by Suresh Laird,   [address], [address]  [place] [address].    Patient is a pleasant six 4-year-[age] male presenting with back pain along with hip and right arm pain.  Over the past month the intensity of the pain has been moderate to severe and he rates pain currently as a 8 out of 10 on numeric rating scale.  The pain does interfere with his daily activities.  The pain occurs constantly.  There is no pattern when symptoms are better or worse.  He describes pain as shooting, throbbing, dull aching with no associated weakness and he does not use any assistive devices.  Activity increases pain include standing, bending, sitting, walking, coughing, sneezing.  Patient does have MRI of the thoracic and lumbar spine with MRI of the thoracic spine showing small disc protrusions at T7-T8, T8-T9 and T9-T10.  MRI lumbar spine with multilevel degenerative disease Worse at L2-L3, L3-L4 and L4-L5 where he also has bilateral neuroforaminal narrowing as well.  Patient had no prior injections or surgeries.  Patient does have a malignant neoplasm in the lungs but no evidence of metastatic disease in his thoracic or lumbar spine.  Prior pain treatments include physical therapy which provides no relief.  Patient has tried physical therapy with no relief.  He tried heat and ice with no relief.  Patient takes aspirin 81 mg.  Patient was put on  tramadol by his oncologist but he states this did not help.  He is also on Tylenol.  He has not trialed any muscle relaxants or nerve pain medications.  Of note patient did get a right hip injection from Dr. Laird and he states this was somewhat helpful but he still having pain going down his right leg.    Review of Systems:    Review of Systems   Constitutional:  Negative for chills and fatigue.   HENT:  Negative for ear pain, mouth sores and sinus pressure.    Eyes:  Negative for pain, redness and visual disturbance.   Respiratory:  Negative for shortness of breath and wheezing.    Cardiovascular:  Negative for chest pain and palpitations.   Gastrointestinal:  Negative for abdominal pain and nausea.   Endocrine: Negative for polyphagia.   Musculoskeletal:  Positive for back pain and gait problem. Negative for arthralgias and neck pain.        Decreased ROM, joint and muscle pain   Skin:  Negative for wound.   Neurological:  Positive for weakness, numbness and headaches. Negative for seizures.   Psychiatric/Behavioral:  Positive for decreased concentration, dysphoric mood and sleep disturbance. The patient is nervous/anxious.            Past Medical History:   Diagnosis Date   • Chest discomfort    • Lung cancer (HCC)        Past Surgical History:   Procedure Laterality Date   • LUNG LOBECTOMY      upper   • TONSILLECTOMY         Family History   Problem Relation Age of Onset   • Lung cancer Mother    • Heart disease Father    • Diabetes Brother    • No Known Problems Son        Social History     Occupational History   • Not on file   Tobacco Use   • Smoking status: Never   • Smokeless tobacco: Never   Vaping Use   • Vaping status: Never Used   Substance and Sexual Activity   • Alcohol use: Never   • Drug use: Never   • Sexual activity: Yes     Partners: Female         Current Outpatient Medications:   •  albuterol (PROVENTIL HFA,VENTOLIN HFA) 90 mcg/act inhaler, Inhale, Disp: , Rfl:   •  celecoxib (CeleBREX) 200  "mg capsule, Take 1 capsule (200 mg total) by mouth 2 (two) times a day as needed for mild pain, Disp: 60 capsule, Rfl: 0  •  gabapentin (NEURONTIN) 300 mg capsule, Take 1 capsule (300 mg total) by mouth 2 (two) times a day Start with one tablet nightly for 5 nights and then increase to twice daily., Disp: 60 capsule, Rfl: 0  •  nitroglycerin (NITROSTAT) 0.4 mg SL tablet, Place 0.4 mg under the tongue every 5 (five) minutes as needed for chest pain, Disp: , Rfl:   •  aspirin (ECOTRIN LOW STRENGTH) 81 mg EC tablet, Take 81 mg by mouth daily, Disp: , Rfl:   •  atorvastatin (LIPITOR) 20 mg tablet, Take 20 mg by mouth daily, Disp: , Rfl:   •  meloxicam (MOBIC) 15 mg tablet, 1 BY MOUTH EVERY DAY FOR 14 DAYS THEN ONCE DAILY AS NEEDED FOR PAIN (Patient not taking: Reported on 12/19/2024), Disp: , Rfl:   •  metoprolol tartrate (LOPRESSOR) 25 mg tablet, Take 12.5 mg by mouth 2 (two) times a day, Disp: , Rfl:     No Known Allergies    Physical Exam:    /77 (Patient Position: Sitting, Cuff Size: Adult)   Pulse 64   Ht 5' 6\" (1.676 m)   Wt 78.9 kg (174 lb)   BMI 28.08 kg/m²     Constitutional: normal, well developed, well nourished, alert, in no distress and non-toxic and no overt pain behavior.  Eyes: anicteric  HEENT: grossly intact  Neck: supple, symmetric, trachea midline and no masses   Pulmonary:even and unlabored  Cardiovascular:No edema or pitting edema present  Skin:Normal without rashes or lesions and well hydrated  Psychiatric:Mood and affect appropriate  Neurologic:Cranial Nerves II-XII grossly intact  Musculoskeletal:normal gait. TTP in midline lumbar spine and bilateral lumbar paraspinal muscles. +SLR on the right.     Imaging     [facility]  Outside Information  Results  MR lumbosacral spine canal w wo contrast (Order [id number])      suggestion  Information displayed in this report may not trend or trigger automated decision support.     MR lumbosacral spine canal w wo contrast  Order: " [id number]  Impression    1.  Multilevel degenerative changes of the lumbar spine most prominent at L2-L3, L3-L4, and L4-L5 where there is moderate bilateral neural foraminal narrowing.  2.  No abnormal enhancement.  Narrative    MRI LUMBAR SPINE WITHOUT AND WITH CONTRAST    Indication:  Back pain.    Comparison: None.    Technique: Multiplanar multisequence MR imaging of the lumbar spine without and with contrast. A total of 16 mL Dotarem administered intravenously without incident.    Findings:    Numbering: Last fully formed disc space is designated L5-S1.  Postsurgical Changes: None.  Bones: Vertebral body heights are maintained. There is grade 1 anterolisthesis at L4-L5. Alignment otherwise normal. Marrow signal is normal.  Intervertebral Discs: Disc desiccation mild disc height loss at L2-L3, L3-L4, L4-L5.  Conus Medullaris: Normal in position. Terminates at the T12-L1 level.  Visualized Spinal Cord: Normal.  Soft Tissues: The visualized soft tissues are normal.  Visualized Abdomen: Normal.  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  Postcontrast Images: No abnormal enhancement.    T12-L1: No disc bulge or herniation. Facet joints are normal. No spinal canal stenosis. No right neural foraminal stenosis. No left neural foraminal stenosis.    L1-L2: No disc bulge or herniation. Facet joints are normal. No spinal canal stenosis. No right neural foraminal stenosis. No left neural foraminal stenosis.    L2-L3: Mild disc bulge. Mild bilateral facet degenerative changes. No spinal canal stenosis. Moderate right foraminal stenosis. Moderate left foraminal stenosis.    L3-L4: Mild disc bulge. Mild bilateral facet degenerative changes. No spinal canal stenosis. Moderate right foraminal stenosis. Moderate left foraminal stenosis.    L4-L5: Mild disc bulge. Moderate bilateral facet degenerative changes. No spinal canal stenosis. Moderate right foraminal stenosis. Mild left foraminal stenosis.    L5-S1:  No disc bulge or herniation. Mild bilateral facet degenerative changes. No spinal canal stenosis. No right neural foraminal stenosis. No left neural foraminal stenosis.  Exam End: 12/14/24  6:19 PM Last Resulted: 12/16/24  9:24 AM   Received From: [facility]  Result Received: 12/19/24  7:49 AM    View Encounter        Received Information            No orders to display       No orders of the defined types were placed in this encounter.

## 2024-12-19 NOTE — H&P (VIEW-ONLY)
Assessment:  1. Lumbar spondylosis    2. Chronic right-sided lumbar radiculopathy    3. Spinal stenosis of lumbar region without neurogenic claudication    4. Malignant neoplasm of upper lobe, left bronchus or lung (HCC)    5. Mid back pain    Patient is a pleasant six 4-year-old male presenting with back pain along with hip and right arm pain.  Over the past month the intensity of the pain has been moderate to severe and he rates pain currently as a 8 out of 10 on numeric rating scale.  The pain does interfere with his daily activities.  The pain occurs constantly.  There is no pattern when symptoms are better or worse.  He describes pain as shooting, throbbing, dull aching with no associated weakness and he does not use any assistive devices.  Activity increases pain include standing, bending, sitting, walking, coughing, sneezing.  Patient does have MRI of the thoracic and lumbar spine with MRI of the thoracic spine showing small disc protrusions at T7-T8, T8-T9 and T9-T10.  MRI lumbar spine with multilevel degenerative disease Worse at L2-L3, L3-L4 and L4-L5 where he also has bilateral neuroforaminal narrowing as well.  Patient had no prior injections or surgeries.  Patient does have a malignant neoplasm in the lungs but no evidence of metastatic disease in his thoracic or lumbar spine.  Prior pain treatments include physical therapy which provides no relief.  Patient has tried physical therapy with no relief.  He tried heat and ice with no relief.  Patient takes aspirin 81 mg.  Patient was put on tramadol by his oncologist but he states this did not help.  He is also on Tylenol.  He has not trialed any muscle relaxants or nerve pain medications.  Of note patient did get a right hip injection from Dr. Laird and he states this was somewhat helpful but he still having pain going down his right leg.    Of note patient does have a history of 3.3 cm invasive adenocarcinoma of the left lung and is status postsurgery,  chemotherapy and radiation and is now in surveillance.  Patient has no evidence of metastatic disease in his thoracic or lumbar spine.  On examination patient with tenderness to palpation of bilateral lumbar paraspinal muscles with positive straight leg raise on the right.  Patient symptoms likely secondary to his known multilevel degenerative disc disease in his lumbar spine with foraminal narrowing at multiple levels with symptoms most concordant to his foraminal narrowing at L4-L5.  Given this would like to schedule patient for L4-L5 lumbar epidural steroid injection fluoroscopic guidance.  Patient counseled risk and benefits of injection therapy like to proceed.  For symptomatically.  Gabapentin 300 mg twice daily.  Titration schedule provided.  Lastly will also provide a prescription for Celebrex 200 mg twice daily as needed.  Patient amenable to this plan.    Plan:  Start celebrex 200 mg BID prn   Start gabapentin 300 mg BID; titration schedule provided  Continue apap 1g TID prn   Schedule for L4-L5 LESI under fluoroscopic guidance   Continue HEP, Treat Your Own Back By Lisandra   No orders of the defined types were placed in this encounter.      New Medications Ordered This Visit   Medications   • celecoxib (CeleBREX) 200 mg capsule     Sig: Take 1 capsule (200 mg total) by mouth 2 (two) times a day as needed for mild pain     Dispense:  60 capsule     Refill:  0   • gabapentin (NEURONTIN) 300 mg capsule     Sig: Take 1 capsule (300 mg total) by mouth 2 (two) times a day Start with one tablet nightly for 5 nights and then increase to twice daily.     Dispense:  60 capsule     Refill:  0       My impressions and treatment recommendations were discussed in detail with the patient, who verbalized understanding and had no further questions.    Complete risks and benefits including bleeding, infection, tissue reaction, nerve injury and allergic reaction were discussed. The approach was demonstrated using models  and literature was provided. Verbal and written consent was obtained.    Follow-up is planned in four weeks time or sooner as warranted.  Discharge instructions were provided. I personally saw and examined the patient and I agree with the above discussed plan of care.    History of Present Illness:    Miguel Rodgers is a 64 y.o. male who presents to [facility] for initial evaluation of the above stated pain complaints. The patient has a past medical and chronic pain history as outlined in the assessment section. He was referred by Suresh Laird,   [address], [address]  [place] [address].    Patient is a pleasant six 4-year[age] male presenting with back pain along with hip and right arm pain.  Over the past month the intensity of the pain has been moderate to severe and he rates pain currently as a 8 out of 10 on numeric rating scale.  The pain does interfere with his daily activities.  The pain occurs constantly.  There is no pattern when symptoms are better or worse.  He describes pain as shooting, throbbing, dull aching with no associated weakness and he does not use any assistive devices.  Activity increases pain include standing, bending, sitting, walking, coughing, sneezing.  Patient does have MRI of the thoracic and lumbar spine with MRI of the thoracic spine showing small disc protrusions at T7-T8, T8-T9 and T9-T10.  MRI lumbar spine with multilevel degenerative disease Worse at L2-L3, L3-L4 and L4-L5 where he also has bilateral neuroforaminal narrowing as well.  Patient had no prior injections or surgeries.  Patient does have a malignant neoplasm in the lungs but no evidence of metastatic disease in his thoracic or lumbar spine.  Prior pain treatments include physical therapy which provides no relief.  Patient has tried physical therapy with no relief.  He tried heat and ice with no relief.  Patient takes aspirin 81 mg.  Patient was put on  tramadol by his oncologist but he states this did not help.  He is also on Tylenol.  He has not trialed any muscle relaxants or nerve pain medications.  Of note patient did get a right hip injection from Dr. Laird and he states this was somewhat helpful but he still having pain going down his right leg.    Review of Systems:    Review of Systems   Constitutional:  Negative for chills and fatigue.   HENT:  Negative for ear pain, mouth sores and sinus pressure.    Eyes:  Negative for pain, redness and visual disturbance.   Respiratory:  Negative for shortness of breath and wheezing.    Cardiovascular:  Negative for chest pain and palpitations.   Gastrointestinal:  Negative for abdominal pain and nausea.   Endocrine: Negative for polyphagia.   Musculoskeletal:  Positive for back pain and gait problem. Negative for arthralgias and neck pain.        Decreased ROM, joint and muscle pain   Skin:  Negative for wound.   Neurological:  Positive for weakness, numbness and headaches. Negative for seizures.   Psychiatric/Behavioral:  Positive for decreased concentration, dysphoric mood and sleep disturbance. The patient is nervous/anxious.            Past Medical History:   Diagnosis Date   • Chest discomfort    • Lung cancer (HCC)        Past Surgical History:   Procedure Laterality Date   • LUNG LOBECTOMY      upper   • TONSILLECTOMY         Family History   Problem Relation Age of Onset   • Lung cancer Mother    • Heart disease Father    • Diabetes Brother    • No Known Problems Son        Social History     Occupational History   • Not on file   Tobacco Use   • Smoking status: Never   • Smokeless tobacco: Never   Vaping Use   • Vaping status: Never Used   Substance and Sexual Activity   • Alcohol use: Never   • Drug use: Never   • Sexual activity: Yes     Partners: Female         Current Outpatient Medications:   •  albuterol (PROVENTIL HFA,VENTOLIN HFA) 90 mcg/act inhaler, Inhale, Disp: , Rfl:   •  celecoxib (CeleBREX) 200  "mg capsule, Take 1 capsule (200 mg total) by mouth 2 (two) times a day as needed for mild pain, Disp: 60 capsule, Rfl: 0  •  gabapentin (NEURONTIN) 300 mg capsule, Take 1 capsule (300 mg total) by mouth 2 (two) times a day Start with one tablet nightly for 5 nights and then increase to twice daily., Disp: 60 capsule, Rfl: 0  •  nitroglycerin (NITROSTAT) 0.4 mg SL tablet, Place 0.4 mg under the tongue every 5 (five) minutes as needed for chest pain, Disp: , Rfl:   •  aspirin (ECOTRIN LOW STRENGTH) 81 mg EC tablet, Take 81 mg by mouth daily, Disp: , Rfl:   •  atorvastatin (LIPITOR) 20 mg tablet, Take 20 mg by mouth daily, Disp: , Rfl:   •  meloxicam (MOBIC) 15 mg tablet, 1 BY MOUTH EVERY DAY FOR 14 DAYS THEN ONCE DAILY AS NEEDED FOR PAIN (Patient not taking: Reported on 12/19/2024), Disp: , Rfl:   •  metoprolol tartrate (LOPRESSOR) 25 mg tablet, Take 12.5 mg by mouth 2 (two) times a day, Disp: , Rfl:     No Known Allergies    Physical Exam:    /77 (Patient Position: Sitting, Cuff Size: Adult)   Pulse 64   Ht 5' 6\" (1.676 m)   Wt 78.9 kg (174 lb)   BMI 28.08 kg/m²     Constitutional: normal, well developed, well nourished, alert, in no distress and non-toxic and no overt pain behavior.  Eyes: anicteric  HEENT: grossly intact  Neck: supple, symmetric, trachea midline and no masses   Pulmonary:even and unlabored  Cardiovascular:No edema or pitting edema present  Skin:Normal without rashes or lesions and well hydrated  Psychiatric:Mood and affect appropriate  Neurologic:Cranial Nerves II-XII grossly intact  Musculoskeletal:normal gait. TTP in midline lumbar spine and bilateral lumbar paraspinal muscles. +SLR on the right.     Imaging     [facility]  Outside Information  Results  MR lumbosacral spine canal w wo contrast (Order [id number])      suggestion  Information displayed in this report may not trend or trigger automated decision support.     MR lumbosacral spine canal w wo contrast  Order: " [id number]  Impression    1.  Multilevel degenerative changes of the lumbar spine most prominent at L2-L3, L3-L4, and L4-L5 where there is moderate bilateral neural foraminal narrowing.  2.  No abnormal enhancement.  Narrative    MRI LUMBAR SPINE WITHOUT AND WITH CONTRAST    Indication:  Back pain.    Comparison: None.    Technique: Multiplanar multisequence MR imaging of the lumbar spine without and with contrast. A total of 16 mL Dotarem administered intravenously without incident.    Findings:    Numbering: Last fully formed disc space is designated L5-S1.  Postsurgical Changes: None.  Bones: Vertebral body heights are maintained. There is grade 1 anterolisthesis at L4-L5. Alignment otherwise normal. Marrow signal is normal.  Intervertebral Discs: Disc desiccation mild disc height loss at L2-L3, L3-L4, L4-L5.  Conus Medullaris: Normal in position. Terminates at the T12-L1 level.  Visualized Spinal Cord: Normal.  Soft Tissues: The visualized soft tissues are normal.  Visualized Abdomen: Normal.  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  Postcontrast Images: No abnormal enhancement.    T12-L1: No disc bulge or herniation. Facet joints are normal. No spinal canal stenosis. No right neural foraminal stenosis. No left neural foraminal stenosis.    L1-L2: No disc bulge or herniation. Facet joints are normal. No spinal canal stenosis. No right neural foraminal stenosis. No left neural foraminal stenosis.    L2-L3: Mild disc bulge. Mild bilateral facet degenerative changes. No spinal canal stenosis. Moderate right foraminal stenosis. Moderate left foraminal stenosis.    L3-L4: Mild disc bulge. Mild bilateral facet degenerative changes. No spinal canal stenosis. Moderate right foraminal stenosis. Moderate left foraminal stenosis.    L4-L5: Mild disc bulge. Moderate bilateral facet degenerative changes. No spinal canal stenosis. Moderate right foraminal stenosis. Mild left foraminal stenosis.    L5-S1:  No disc bulge or herniation. Mild bilateral facet degenerative changes. No spinal canal stenosis. No right neural foraminal stenosis. No left neural foraminal stenosis.  Exam End: 12/14/24  6:19 PM Last Resulted: 12/16/24  9:24 AM   Received From: [facility]  Result Received: 12/19/24  7:49 AM    View Encounter        Received Information            No orders to display       No orders of the defined types were placed in this encounter.

## 2024-12-20 ENCOUNTER — HOSPITAL ENCOUNTER (OUTPATIENT)
Facility: AMBULARY SURGERY CENTER | Age: 64
Setting detail: OUTPATIENT SURGERY
Discharge: HOME | End: 2024-12-20
Attending: STUDENT IN AN ORGANIZED HEALTH CARE EDUCATION/TRAINING PROGRAM | Admitting: STUDENT IN AN ORGANIZED HEALTH CARE EDUCATION/TRAINING PROGRAM
Payer: MEDICARE

## 2024-12-20 ENCOUNTER — APPOINTMENT (OUTPATIENT)
Dept: RADIOLOGY | Facility: HOSPITAL | Age: 64
End: 2024-12-20
Payer: MEDICARE

## 2024-12-20 VITALS
TEMPERATURE: 97.8 F | OXYGEN SATURATION: 96 % | RESPIRATION RATE: 16 BRPM | DIASTOLIC BLOOD PRESSURE: 63 MMHG | SYSTOLIC BLOOD PRESSURE: 117 MMHG | HEART RATE: 71 BPM

## 2024-12-20 PROBLEM — M54.16 LUMBAR RADICULOPATHY: Status: ACTIVE | Noted: 2024-12-20

## 2024-12-20 PROCEDURE — NC001 PR NO CHARGE: Performed by: STUDENT IN AN ORGANIZED HEALTH CARE EDUCATION/TRAINING PROGRAM

## 2024-12-20 PROCEDURE — 62323 NJX INTERLAMINAR LMBR/SAC: CPT | Performed by: STUDENT IN AN ORGANIZED HEALTH CARE EDUCATION/TRAINING PROGRAM

## 2024-12-20 RX ORDER — LIDOCAINE HYDROCHLORIDE 10 MG/ML
INJECTION, SOLUTION EPIDURAL; INFILTRATION; INTRACAUDAL; PERINEURAL AS NEEDED
Status: DISCONTINUED | OUTPATIENT
Start: 2024-12-20 | End: 2024-12-20 | Stop reason: HOSPADM

## 2024-12-20 RX ORDER — BUPIVACAINE HYDROCHLORIDE 2.5 MG/ML
INJECTION, SOLUTION EPIDURAL; INFILTRATION; INTRACAUDAL AS NEEDED
Status: DISCONTINUED | OUTPATIENT
Start: 2024-12-20 | End: 2024-12-20 | Stop reason: HOSPADM

## 2024-12-20 RX ORDER — METHYLPREDNISOLONE ACETATE 80 MG/ML
INJECTION, SUSPENSION INTRA-ARTICULAR; INTRALESIONAL; INTRAMUSCULAR; SOFT TISSUE AS NEEDED
Status: DISCONTINUED | OUTPATIENT
Start: 2024-12-20 | End: 2024-12-20 | Stop reason: HOSPADM

## 2024-12-20 NOTE — DISCHARGE INSTRUCTIONS
Epidural Steroid Injection   WHAT YOU NEED TO KNOW:   An epidural steroid injection (COLBY) is a procedure to inject steroid medicine into the epidural space. The epidural space is between your spinal cord and vertebrae. Steroids reduce inflammation and fluid buildup in your spine that may be causing pain. You may be given pain medicine along with the steroids.          ACTIVITY  Do not drive or operate machinery today.  No strenuous activity today - bending, lifting, etc.  You may resume normal activites starting tomorrow - start slowly and as tolerated.  You may shower today, but no tub baths or hot tubs.  You may have numbness for several hours from the local anesthetic. Please use caution and common sense, especially with weight-bearing activities.    CARE OF THE INJECTION SITE  If you have soreness or pain, apply ice to the area today (20 minutes on/20 minutes off).  Starting tomorrow, you may use warm, moist heat or ice if needed.  You may have an increase or change in your discomfort for 36-48 hours after your treatment.  Apply ice and continue with any pain medication you have been prescribed.  Notify the [facility] if you have any of the following: redness, drainage, swelling, headache, stiff neck or fever above 100°F.    SPECIAL INSTRUCTIONS  Our office will contact you in approximately 14 days for a progress report.    MEDICATIONS  Continue to take all routine medications.  Our office may have instructed you to hold some medications.    As no general anesthesia was used in today's procedure, you should not experience any side effects related to anesthesia.     If you are diabetic, the steroids used in today's injection may temporarily increase your blood sugar levels after the first few days after your injection. Please keep a close eye on your sugars and alert the doctor who manages your diabetes if your sugars are significantly high from your baseline or you are symptomatic.     If you have a  problem specifically related to your procedure, please call our office at [phone].  Problems not related to your procedure should be directed to your primary care physician.

## 2024-12-20 NOTE — INTERVAL H&P NOTE
H&P reviewed. After examining the patient I find no changes in the patients condition since the H&P had been written.    Vitals:    12/20/24 1403   BP: 122/76   Pulse: 77   Resp: 16   Temp: 97.8 °F (36.6 °C)   SpO2: 98%

## 2024-12-20 NOTE — OP NOTE
Pre-procedure Diagnosis: Lumbar radiculopathy  Post-procedure Diagnosis: Lumbar radiculopathy  Procedure Title(s):  1.  L4-L5 interlaminar epidural steroid injection      2. Intraoperative fluoroscopy  Attending Surgeon:   Chris Sharpe MD  Anesthesia:   Local     Indications: The patient is a 64 y.o. year-old male with a diagnosis of lumbar radiculopathy. The patient's history and physical exam were reviewed.  The risks, benefits and alternatives to the procedure were discussed, and all questions were answered to the patient's satisfaction. The patient agreed to proceed, and written informed consent was obtained.    Procedure in Detail: The patient was brought into the procedure room and placed in the prone position on the fluoroscopy table. The area of the lumbar spine was prepped with chlorhexidine gluconate solution times one and draped in a sterile manner.    The L4-L5 interspace was identified and marked under AP fluoroscopy. The skin and subcutaneous tissues in the area were anesthetized with 1% lidocaine. A 20-gauge Tuohy epidural needle was directed toward the interspace under fluoroscopic guidance until the ligamentum flavum was engaged. From this point, a loss of resistance technique with air was used to identify entrance of the needle into the epidural space. Once an appropriate loss was obtained, negative aspiration was confirmed, and 1 ml Omnipaque 300 contrast solution was injected. An appropriate epidurogram was noted.    Then, after negative aspiration, a solution consisting of 1-mL depo-medrol (80mg/mL) and 1-mL bupivacaine 0.25% and 3-mL preservative-free saline was easily injected. The needle was removed with a 1% lidocaine flush. The patient's back was cleaned and a bandage was placed over the site of needle insertion.    Disposition: The patient tolerated the procedure well, and there were no apparent complications. The patient was taken to the recovery area where written discharge  instructions for the procedure were given.     Estimated Blood Loss: None  Specimens Obtained: N/A

## 2024-12-30 ENCOUNTER — TELEPHONE (OUTPATIENT)
Dept: PAIN MEDICINE | Facility: CLINIC | Age: 64
End: 2024-12-30

## 2024-12-30 ENCOUNTER — RESULTS FOLLOW-UP (OUTPATIENT)
Dept: PAIN MEDICINE | Facility: CLINIC | Age: 64
End: 2024-12-30

## 2024-12-30 ENCOUNTER — HOSPITAL ENCOUNTER (OUTPATIENT)
Dept: RADIOLOGY | Facility: HOSPITAL | Age: 64
Discharge: HOME | End: 2024-12-30
Payer: MEDICARE

## 2024-12-30 ENCOUNTER — TELEPHONE (OUTPATIENT)
Age: 64
End: 2024-12-30

## 2024-12-30 DIAGNOSIS — M54.12 CERVICAL RADICULOPATHY: Primary | ICD-10-CM

## 2024-12-30 DIAGNOSIS — M54.12 CERVICAL RADICULOPATHY: ICD-10-CM

## 2024-12-30 PROCEDURE — 72050 X-RAY EXAM NECK SPINE 4/5VWS: CPT

## 2024-12-30 RX ORDER — GABAPENTIN 400 MG/1
400 CAPSULE ORAL 3 TIMES DAILY
Qty: 90 CAPSULE | Refills: 0 | Status: SHIPPED | OUTPATIENT
Start: 2024-12-30

## 2024-12-30 NOTE — TELEPHONE ENCOUNTER
S/w pt. Pt agreeable to Cervical PT and Cervical Xrays. Please place orders. OV cancelled for tomorrow and pt was advised we will follow up when we have results of xrays.      Pt is requesting an increase in Gabapentin to help with his pain so that he can decrease or stop Celebrex- Please advise

## 2024-12-30 NOTE — TELEPHONE ENCOUNTER
S/w pt and wife regarding previous. Pt had an L4-5 Lumbar Epidural Injection on 12/20 with relief. Pt continues to have RT arm pain which he describes as pain, numbness and tingling  into his RT hand.Pt recently had a Cardiac workup and this pain is not new. Pt is taking Gabapentin 300 mg bid which he just started on  12/19. Pt aware it takes 2 weeks at prescribed dose for Gabapentin to establish in his system to know the benefit. Pt states he is also feeling nauseous which could be related to Celebrex he is taking. Pt is taking with food. Pt advised he can try stopping Celebrex to see if nausea improves.    Please advise- Pt was scheduled for an ov tomorrow 12/31 to eval his RT arm pain and discuss medication options, possibly increasing gabapentin. Please advise if pt should keep this appt and pt is asking if he should have any testing ordered prior to his appt?

## 2024-12-30 NOTE — TELEPHONE ENCOUNTER
Caller: maribel Rivera    Doctor: Dell    Reason for call: asking for the nurse    Call back#: call transferred to the nurse

## 2024-12-30 NOTE — RESULT ENCOUNTER NOTE
S/w pt and wife. Both advised of same and verbalized understanding. Per wife pt was able to have his other MRI's approved without going to PT. Pt is requesting to have an MRI ordered and will attempt to schedule and see if MRI will be authorized by insurance.     Please advise- Can cervical MRI  be ordered?

## 2024-12-30 NOTE — TELEPHONE ENCOUNTER
Caller: Miguel     Doctor: Dell     Reason for call: Patient states 4-5/10 pain level and gets worse 7-8/10 in the afternoon patient states no relief since injection and medication not helping at all please advise     Call back#: [phone]

## 2024-12-30 NOTE — TELEPHONE ENCOUNTER
S/w pt and wife. Both advised of same and verbalized understanding. Per wife pt was able to have his other MRI's approved without going to PT. Pt is requesting to have an MRI ordered and will attempt to schedule and see if MRI will be authorized by insurance.      Please advise- Can cervical MRI  be ordered?         Chris Sharpe MD to [facility]       12/30/24  3:28 PM  Result Note  Patient with DDD at multiple levels with disc height loss at C5-C6 and C6-C7 anf foraminal narrowing at these levels as well. Still recommend physical therapy and will get an MRI after completion.

## 2024-12-30 NOTE — TELEPHONE ENCOUNTER
S/w pt and advised of results. Pt is requesting an MRI order ans he did not have to attend PT for previous MRIs.     There are 2 tasks- Please refer to other task

## 2024-12-31 DIAGNOSIS — M54.12 CERVICAL RADICULOPATHY: Primary | ICD-10-CM

## 2025-01-02 ENCOUNTER — EVALUATION (OUTPATIENT)
Dept: PHYSICAL THERAPY | Facility: CLINIC | Age: 65
End: 2025-01-02
Payer: MEDICARE

## 2025-01-02 DIAGNOSIS — M54.12 CERVICAL RADICULOPATHY: ICD-10-CM

## 2025-01-02 DIAGNOSIS — M54.2 CERVICALGIA: Primary | ICD-10-CM

## 2025-01-02 PROCEDURE — 97162 PT EVAL MOD COMPLEX 30 MIN: CPT

## 2025-01-02 NOTE — PROGRESS NOTES
PT Evaluation     Today's date: 2025  Patient name: Miguel Rodgers  : 1960  MRN: [id number]  Referring provider: Chris Sharpe MD  Dx:   Encounter Diagnosis     ICD-10-CM    1. Cervicalgia  M54.2       2. Cervical radiculopathy  M54.12           Start Time: 1145  Stop Time: 1230  Total time in clinic (min): 45 minutes  Assessment:  Pt. arriving to clinic status with complaints of cervical pain. Able to reproduce thoracic pain via flexion and reduced pain with improved posture. Pt has a significant history of lower back pain that he is currently receiving treatment for ( 2 injections thus far) to help control lower back pain.  Pt exhibits reproduction of hip and lower back pain with coughing, sneezing and has a positive SLUMP and SLR testing.  Patient has been educated on joint protection to reduce pain and inflammation as well as an HEP to reinforce proper ergonomics and posturing.  Patient would benefit from skilled physical therapy services to address their aforementioned functional limitations and progress towards prior level of function and independence with home exercise program.     Short term goals  2 weeks  - independent with joint protection strategies to reduce pain by 50%  - Patient able to perform and maintain cervical neutral positioning during seated positioning without verbal or manual cueing  - Patient to exhibit independence with home exercise program    Long term goals 4 weeks  - improve cervical ROM to painfree in all directions  - abolish trigger points in upper trapezius  - abolish muscle spasms  - Improve gross upper extremity strength to 5/5 to allow to return to heavy household activity and self care  - Patient to reduce pain to 1/10 at its worst to improve QOL and return to function   - independent with home exercise program for long term maintenance program  - Abolish UE symptoms  _ FOTO to pred score or greater    Impairments:    restricted ROM    decreased strength   pain  with function   activity intolerance   weight bearing intolerance     Prognosis:  Excellent  Positive and negative prognostic indicator(s):  positive attitude about recovery    Planned interventions:  home exercise program, patient education, manual therapy, flexibility, functional range of motion exercises, and strengthening    Duration in visits:  12  Frequency: 2 visits per week  Duration in weeks:  6  Plan of Care beginning date: 1/2/25  Plan of Care expiration date: 6 weeks - 2/13/2025  Treatment plan discussed with: Patient      History of Current Injury: onset of shoulder blade right side and lower cervical region insidious onset.  Reports having extreme pain into right UE from armpit into right hand and fingers at times that waxes and wanes.  Tingling and numbness into right hand which comes and goes 3-4/10  and varies for unknown reasons.  Went to MD and  she  Will have an MRI in 1 week and given gabapentin 3x per day and no relief noted.  Relates pain is constant during the day and eases with lying down.    Problems:  sitting   Pain location: pain in cervical region 4-8/10  Pain descriptors:  sharp pain  Pain intensity:  average pain 5/10 in cervical and right UE      Aggravating factors: sitting down  Easing factors: lying down, sitting up straight     Imaging: MRI pending  Hobbies/Interests: yard work   Occupation: retired    OBJECTIVE:    POSTURE:   Standing: forward head  Sitting: forward head and rounded shoulders      Cervical AROM limitations:  (* =  Pain)    Cervical ROM Evaluation Re-evaluation Re-evaluation Re-evaluation Re-evaluation   Date 1/2/2025       Flexion           wnl       Extension            wnl       Protraction            wnl       Retraction             wnl        L R L R L R L R L R   Rotation       Min loss     Min loss           Sidebending       Min/mod loss     Min/mod loss             Cervical   Repeated flexion 1x10:   Increased symptoms in the thoracic spine  Repeated  retraction 1x10:  Decreased pain - abolished pain     Shoulder AROM sitting/ PROM supine:   (*= pain)    Shoulder AROM Evaluation Evaluation Re-evaluation Re-evaluation Re-evaluation   Date 1/2/2025 Right 1/2/2025 Left         Shoulder flexion Wnl             wnl         Shoulder abduction wnl wnl         Shoulder External rotation wnl   wnl         Shoulder Internal rotation wnl   wnl         Shoulder Extension                        MMT / Strength testing:        MMT (manual muscle testing) EVAL  Eval RE-eval Re-eval Re-eval   Date 1/2/2025 1/2/2025               LEFT RIGHT Involved side     Shoulder Flexion       5/5  5/5          Shoulder Abduction       5/5  5/5         Shoulder Internal rotation        5/5  5/5         Shoulder External rotation 5/5  5/5         Elbow Flexion       55/5  5/5         Elbow Extension       5/5   5/5         Mid trapezius       5/5  5/5         Lower trapezius       5/5  5/5          Rhomboids       5/5 5/5         Gross           lbs  lbs                      Myotomes UE Right  Left   C4 shoulder elevation         5/5           5/5   C5 shoulder abduction         5/5           5/5   C6 Elbow flexion         5/5           5/5   C6 Wrist Extension         5/5           5/5   C7 Elbow Extension         5/5           5/5   C7 Wrist Flexion         5/5           5/5   C8 Finger flexion          5/5           5/5   T1 Finger Abduction         5/5           5/5         Palpation:  1/2/2025 mild tightness in     Sensation to light touch:   1/2/2025 tenderness to light touch at forearm volar aspect with increased sensitivity      Special tests:   Spurlings/Quadrant Test neg  ULTT neg ulnar, median and radial       Precautions:     Date        1/2/2025              Visits # EVAL  2 3  4  5  6    Manual                STM/IASTM  5'               MFR                Stretching                               Neuro                chin tucks  x10              scap retraction  x10            "                                                  TE                Pec stretching  10\" x 4              wand ext                                                                TA                band rows/extension (pulling)  x20 ytb                                                             Gait                                                               Modalities                                              HEP - education on posture, joint protection strategies to reduce pain.       x15'               Flowsheet Rows      Flowsheet Row Most Recent Value   PT/OT G-Codes    Current Score 56   Projected Score 64                 "

## 2025-01-03 ENCOUNTER — TELEPHONE (OUTPATIENT)
Age: 65
End: 2025-01-03

## 2025-01-03 DIAGNOSIS — M54.12 CERVICAL RADICULOPATHY: Primary | ICD-10-CM

## 2025-01-03 RX ORDER — METHYLPREDNISOLONE 4 MG/1
TABLET ORAL
Qty: 1 EACH | Refills: 0 | Status: SHIPPED | OUTPATIENT
Start: 2025-01-03

## 2025-01-03 NOTE — TELEPHONE ENCOUNTER
S/w the patient and reviewed. He stated the pain is pretty intense at times. He is really having trouble sleeping because he normally lies on his right side to sleep. The pain is pretty sharp in his right shoulder, scapular region and radiates down into his right armpit and wraps around into his elbow and his skin feels pretty sensitive and he has numbness and tingling into his hand. He stated he has been on the gabapentin for almost 3 weeks now and no relief as of yet. He does not take celebrex anymore because it causes stomach problems. He is scheduled for the MRI on 1/8/25. He denies having DM. What do you think  SJ? Do you think a medrol dose pack could help him? He is also being seen by oncology. But he has nothing for pain. OTC Tylenol does nothing. He was told not to take meloxicam. Not sure why? Please advise. thanks

## 2025-01-03 NOTE — TELEPHONE ENCOUNTER
S/w the patient and reviewed. He was agreeable in trying the medrol dose pack to help with his pain. Reviewed that he should not take OTC NSAID's while taking the medrol dose pack. Please sent to [facility] in [place] on file. He appreciated the call. Thanks   No

## 2025-01-03 NOTE — TELEPHONE ENCOUNTER
Caller: Patient     Doctor: Dell     Reason for call: Having pain into the right arm. States it hurts from the shoulder down to the finger tips. Patient states that he gets a sharp pain from the inside arm, and the elbow is very sensitive with the pain going down to the wrist. Also experiencing a tingling sensation in the hand and finger tips and feels as though he is losing circulation into the arm. Not able to get sleep due to this.         Wanting to see if MRI of the arm can be ordered to be done at the same time as the MRI of the cervical spine?     Please advise     Call back#: [phone]

## 2025-01-07 ENCOUNTER — OFFICE VISIT (OUTPATIENT)
Dept: PHYSICAL THERAPY | Facility: CLINIC | Age: 65
End: 2025-01-07
Payer: MEDICARE

## 2025-01-07 DIAGNOSIS — M54.12 CERVICAL RADICULOPATHY: ICD-10-CM

## 2025-01-07 DIAGNOSIS — M54.2 CERVICALGIA: Primary | ICD-10-CM

## 2025-01-07 PROCEDURE — 97535 SELF CARE MNGMENT TRAINING: CPT

## 2025-01-07 PROCEDURE — 97112 NEUROMUSCULAR REEDUCATION: CPT

## 2025-01-07 PROCEDURE — 97140 MANUAL THERAPY 1/> REGIONS: CPT

## 2025-01-07 NOTE — PROGRESS NOTES
Daily Note     Today's date: 2025  Patient name: Miguel Rodgers  : 1960  MRN: [id number]  Referring provider: Chris Sharpe MD  Dx:   Encounter Diagnosis     ICD-10-CM    1. Cervicalgia  M54.2       2. Cervical radiculopathy  M54.12                      Subjective: arm is sore tingling and numbness and pain in mid back and down the forearm      Objective: See treatment diary below      Assessment: Tolerated treatment well. Patient exhibited good technique with therapeutic exercises and would benefit from continued PT.  Mild spasms of rhomboids noted compared to opposite side.  Needs moderate cueing for form Today's routine required verbal and manual cueing to maintain and perform correct form during trunk stabilization activities.  Patient needed one on one attention to ensure proper alignment to appropriately engage specific musculature to stabilize trunk in a neutral spine alignment  and maintain correct posture to reduce stress on spine.  Appears to respond best to NWB positions and neutral to extension      Plan: Continue per plan of care.              Date     25           Visits # EVAL  2 3  4  5      Manual                manual traction     5'            Ktape postural    5'             STM to rhomboids   5'                            Neuro                scap squeeze     X10             c/s extension /t's ext in chair    x10  Blue 1/2 foam            chin tucks    x10            wand extension    x10                           TE                                                                                               TA                                                                               Gait                                                               Modalities                                              HEP

## 2025-01-08 ENCOUNTER — RESULTS FOLLOW-UP (OUTPATIENT)
Dept: PAIN MEDICINE | Facility: CLINIC | Age: 65
End: 2025-01-08

## 2025-01-08 ENCOUNTER — HOSPITAL ENCOUNTER (OUTPATIENT)
Dept: RADIOLOGY | Facility: HOSPITAL | Age: 65
Discharge: HOME | End: 2025-01-08
Attending: STUDENT IN AN ORGANIZED HEALTH CARE EDUCATION/TRAINING PROGRAM
Payer: MEDICARE

## 2025-01-08 ENCOUNTER — TELEPHONE (OUTPATIENT)
Age: 65
End: 2025-01-08

## 2025-01-08 DIAGNOSIS — M54.12 CERVICAL RADICULOPATHY: ICD-10-CM

## 2025-01-08 PROCEDURE — 72141 MRI NECK SPINE W/O DYE: CPT

## 2025-01-08 NOTE — TELEPHONE ENCOUNTER
Caller: Radiologist    Doctor: MARGY    Reason for call: MRI significant finding    Call back#:

## 2025-01-08 NOTE — TELEPHONE ENCOUNTER
Scheduled patient for IRVIN  Patient denies RX blood thinners/ NSAIDS  Nothing to eat or drink 1 hour prior to procedure  Needs to arrange transportation  Proper clothing for procedure  No vaccines 2 weeks prior or after procedure  If ill or place on antibiotics, please call to reschedule

## 2025-01-08 NOTE — RESULT ENCOUNTER NOTE
RN s/w pt and pts wife advising of results below. Pt verbalized understanding and would like to proceed w/ IRVIN. RN made tele task and sent to sched advising sched that pt is no longer taking ASA 81mg per cardio advisement and no longer taking Celebrex.

## 2025-01-08 NOTE — TELEPHONE ENCOUNTER
Caller: Miguel     Doctor: MARGY    Reason for call: pt advised that he would like to add he is having even more pain in his upper back, right arm and hips while he waits for next steps after his MRI    Call back#: [phone]

## 2025-01-08 NOTE — TELEPHONE ENCOUNTER
Caller: pt    Doctor: dalton    Reason for call: pt is in a lot of pain and wants to know if something can be done for the pain while he waits for the injection?    Call back#: [phone]

## 2025-01-08 NOTE — TELEPHONE ENCOUNTER
S/w pt and pts wife regarding MRI img below. Pt would like to proceed w/ IRVIN. Pt states he is no longer taking ASA 81mg per cardio and pt states he no longer takes Celebrex.     Pls sched accordingly. Thank you!

## 2025-01-08 NOTE — TELEPHONE ENCOUNTER
----- Message from Chris Sharpe MD sent at 1/8/2025  1:49 PM EST -----  Patient with multilevel DDD with mild central and severe foraminal stenosis at C5-C6 and mild central and moderate left foraminal stenosis at C6-C&. Patient also with multinodular thyromegaly, would recommend follow up with PCP in regards to this. In regards to his cervical stenosis, if patient is amenable, would recommend C7-T1 IRVIN.

## 2025-01-10 ENCOUNTER — OFFICE VISIT (OUTPATIENT)
Dept: PHYSICAL THERAPY | Facility: CLINIC | Age: 65
End: 2025-01-10
Payer: MEDICARE

## 2025-01-10 DIAGNOSIS — M54.2 CERVICALGIA: Primary | ICD-10-CM

## 2025-01-10 DIAGNOSIS — M54.12 CERVICAL RADICULOPATHY: ICD-10-CM

## 2025-01-10 PROCEDURE — 97140 MANUAL THERAPY 1/> REGIONS: CPT

## 2025-01-10 PROCEDURE — 97110 THERAPEUTIC EXERCISES: CPT

## 2025-01-10 PROCEDURE — 97010 HOT OR COLD PACKS THERAPY: CPT

## 2025-01-10 NOTE — PROGRESS NOTES
Daily Note     Today's date: 1/10/2025  Patient name: Miguel Rodgers  : 1960  MRN: [id number]  Referring provider: Chris Sharpe MD  Dx:   Encounter Diagnosis     ICD-10-CM    1. Cervicalgia  M54.2       2. Cervical radiculopathy  M54.12                      Subjective: great deal of pain today upon awakening.  MRI revealed need for epidural injection.  Morning pain is bad with radiation into right UE arm and forearm.  Numbness tingling and pain and sensitivity along medial arm.        Objective: See treatment diary below      Assessment: Tolerated treatment well. Patient would benefit from continued PT.  Immense pain today and senstivity at ulnar nerve - neg tinels sign at ulnar nerve but sensitivity along ulnar nerve distribution      Plan: Continue per plan of care.              Date     25         Visits # EVAL  2 3  4  5      Manual                manual traction     5'            Ktape postural    5'             STM to rhomboids   5'  10' seated                           Neuro                scap squeeze     X10  x10           c/s extension /t's ext in chair    x10  Blue 1/2 foam            chin tucks    x10 x10           wand extension    x10 x10                          TE                                                                                               TA                                                                               Gait                                                               Modalities                                              HEP      education on HEP-ed on posture  On pillows in bed  Heat vs cold

## 2025-01-12 ENCOUNTER — DOCUMENTATION (OUTPATIENT)
Dept: PAIN MEDICINE | Facility: CLINIC | Age: 65
End: 2025-01-12

## 2025-01-12 ENCOUNTER — NURSE TRIAGE (OUTPATIENT)
Dept: OTHER | Facility: OTHER | Age: 65
End: 2025-01-12

## 2025-01-12 DIAGNOSIS — G89.4 CHRONIC PAIN SYNDROME: Primary | ICD-10-CM

## 2025-01-12 RX ORDER — METHOCARBAMOL 500 MG/1
500 TABLET, FILM COATED ORAL 2 TIMES DAILY PRN
Qty: 60 TABLET | Refills: 1 | Status: SHIPPED | OUTPATIENT
Start: 2025-01-12 | End: 2025-01-21

## 2025-01-12 NOTE — TELEPHONE ENCOUNTER
Reason for Disposition  • [1] SEVERE back pain (e.g., excruciating, unable to do any normal activities) AND [2] not improved 2 hours after pain medicine    Protocols used: Back Pain-Adult-AH

## 2025-01-12 NOTE — TELEPHONE ENCOUNTER
"Answer Assessment - Initial Assessment Questions  1. ONSET: \"When did the pain begin?\" (e.g., minutes, hours, days)        End of September- on going    2. LOCATION: \"Where does it hurt?\" (upper, mid or lower back)      In between shoulder blades and now upper back    3. SEVERITY: \"How bad is the pain?\"  (e.g., Scale 1-10; mild, moderate, or severe)        Severe pain- he is getting little sleep    4. PATTERN: \"Is the pain constant?\" (e.g., yes, no; constant, intermittent)         Gradually getting worse over time     5. RADIATION: \"Does the pain shoot into your legs or somewhere else?\"        Yes  Right arm down  to elbow. Right leg and hip area.  sharp pain. Starting to go to left arm- which is new for patient        6. BACK OVERUSE:  \"Any recent lifting of heavy objects, strenuous work or exercise?\"        No    7. MEDICINES: \"What have you taken so far for the pain?\" (e.g., nothing, acetaminophen, NSAIDS)        Tylenol, Gabapentin increased, and finished medrol dose lm    8. NEUROLOGIC SYMPTOMS: \"Do you have any weakness, numbness, or problems with bowel/bladder control?\"      Numbness in both hands    Protocols used: Back Pain-Adult-AH    Pt increased to Gabapentin 400mg TID, tried tylenol, and finished steroid pack with no relief. ESC message sent to on call provider. Provider stated he could send a muscle relaxant methocarbamol 500mg bid prn to add to his current regimen to help with pain . Informed patient and he is agreeable to try medication. Message sent to office as hp to see if injection date can be moved up sooner per patient request.   "

## 2025-01-13 ENCOUNTER — OFFICE VISIT (OUTPATIENT)
Dept: PHYSICAL THERAPY | Facility: CLINIC | Age: 65
End: 2025-01-13
Payer: MEDICARE

## 2025-01-13 DIAGNOSIS — M54.2 CERVICALGIA: Primary | ICD-10-CM

## 2025-01-13 DIAGNOSIS — M54.12 CERVICAL RADICULOPATHY: ICD-10-CM

## 2025-01-13 PROCEDURE — 97112 NEUROMUSCULAR REEDUCATION: CPT | Performed by: PHYSICAL MEDICINE & REHABILITATION

## 2025-01-13 PROCEDURE — 97140 MANUAL THERAPY 1/> REGIONS: CPT | Performed by: PHYSICAL MEDICINE & REHABILITATION

## 2025-01-13 PROCEDURE — 97110 THERAPEUTIC EXERCISES: CPT | Performed by: PHYSICAL MEDICINE & REHABILITATION

## 2025-01-13 NOTE — PROGRESS NOTES
"Daily Note     Today's date: 2025  Patient name: Miguel Rodgers  : 1960  MRN: [id number]  Referring provider: Chris Sharpe MD  Dx:   Encounter Diagnosis     ICD-10-CM    1. Cervicalgia  M54.2       2. Cervical radiculopathy  M54.12             Start Time: 1100  Stop Time: 1145  Total time in clinic (min): 45 minutes    Subjective: Patient reports he was prescribed a new medication from pain management that seems to have been effective, noting that he took one this morning and that took the edge off. Patient notes that he is still scheduled for an epidural injection for the end of this month.      Objective: See treatment diary below      Assessment: Tolerated treatment fair. Focused today on manual intervention of the cervical spine with ulnar nerve glides. Symptoms reduced once in the supine position. Gentle self mobilization of thoracic and cervical spine today. Patient would benefit from continued PT.     Plan: Continue per plan of care.              Date     2024       Visits # EVAL  2 3  4  5      Manual                manual traction     5'            Ktape postural    5'             STM to rhomboids   5'  10' seated            C-Spine        Gentle cervical mobilization with traction  Ulnar nerve glide LUE       Neuro                scap squeeze     X10  x10  10x supine         c/s extension /t's ext in chair    x10  Blue 1/2 foam    Chin tucks with extension self OP       Thoracic rotation    10x 10\" R/L      chin tucks    x10 x10           wand extension    x10 x10   x10                        TE                                                                                               TA                                                                               Gait                                                               Modalities                                              HEP      education on HEP-ed on posture  On pillows in bed  Heat vs cold           "

## 2025-01-15 ENCOUNTER — APPOINTMENT (OUTPATIENT)
Dept: PHYSICAL THERAPY | Facility: CLINIC | Age: 65
End: 2025-01-15
Payer: MEDICARE

## 2025-01-15 NOTE — TELEPHONE ENCOUNTER
S/w patient, advised that Dr. Sharpe would like patients procedure moved up to this Friday 01/17/25. Patient aware he will get a call tomorrow with arrival time. Patient was very appreciative of call.

## 2025-01-17 ENCOUNTER — HOSPITAL ENCOUNTER (OUTPATIENT)
Facility: AMBULARY SURGERY CENTER | Age: 65
Setting detail: OUTPATIENT SURGERY
Discharge: HOME | End: 2025-01-17
Attending: STUDENT IN AN ORGANIZED HEALTH CARE EDUCATION/TRAINING PROGRAM | Admitting: STUDENT IN AN ORGANIZED HEALTH CARE EDUCATION/TRAINING PROGRAM
Payer: MEDICARE

## 2025-01-17 ENCOUNTER — APPOINTMENT (OUTPATIENT)
Dept: RADIOLOGY | Facility: HOSPITAL | Age: 65
End: 2025-01-17
Payer: MEDICARE

## 2025-01-17 VITALS
HEART RATE: 62 BPM | TEMPERATURE: 97.8 F | RESPIRATION RATE: 18 BRPM | SYSTOLIC BLOOD PRESSURE: 113 MMHG | OXYGEN SATURATION: 97 % | DIASTOLIC BLOOD PRESSURE: 76 MMHG

## 2025-01-17 PROBLEM — M54.12 CERVICAL RADICULOPATHY: Status: ACTIVE | Noted: 2025-01-17

## 2025-01-17 PROCEDURE — 62321 NJX INTERLAMINAR CRV/THRC: CPT | Performed by: STUDENT IN AN ORGANIZED HEALTH CARE EDUCATION/TRAINING PROGRAM

## 2025-01-17 PROCEDURE — NC001 PR NO CHARGE: Performed by: STUDENT IN AN ORGANIZED HEALTH CARE EDUCATION/TRAINING PROGRAM

## 2025-01-17 RX ORDER — METHYLPREDNISOLONE ACETATE 80 MG/ML
INJECTION, SUSPENSION INTRA-ARTICULAR; INTRALESIONAL; INTRAMUSCULAR; SOFT TISSUE AS NEEDED
Status: DISCONTINUED | OUTPATIENT
Start: 2025-01-17 | End: 2025-01-17 | Stop reason: HOSPADM

## 2025-01-17 RX ORDER — LIDOCAINE HYDROCHLORIDE 10 MG/ML
INJECTION, SOLUTION EPIDURAL; INFILTRATION; INTRACAUDAL; PERINEURAL AS NEEDED
Status: DISCONTINUED | OUTPATIENT
Start: 2025-01-17 | End: 2025-01-17 | Stop reason: HOSPADM

## 2025-01-17 NOTE — DISCHARGE INSTRUCTIONS
Epidural Steroid Injection   WHAT YOU NEED TO KNOW:   An epidural steroid injection (COLBY) is a procedure to inject steroid medicine into the epidural space. The epidural space is between your spinal cord and vertebrae. Steroids reduce inflammation and fluid buildup in your spine that may be causing pain. You may be given pain medicine along with the steroids.          ACTIVITY  Do not drive or operate machinery today.  No strenuous activity today - bending, lifting, etc.  You may resume normal activites starting tomorrow - start slowly and as tolerated.  You may shower today, but no tub baths or hot tubs.  You may have numbness for several hours from the local anesthetic. Please use caution and common sense, especially with weight-bearing activities.    CARE OF THE INJECTION SITE  If you have soreness or pain, apply ice to the area today (20 minutes on/20 minutes off).  Starting tomorrow, you may use warm, moist heat or ice if needed.  You may have an increase or change in your discomfort for 36-48 hours after your treatment.  Apply ice and continue with any pain medication you have been prescribed.  Notify the [facility] if you have any of the following: redness, drainage, swelling, headache, stiff neck or fever above 100°F.    SPECIAL INSTRUCTIONS  Our office will contact you in approximately 14 days for a progress report.    MEDICATIONS  Continue to take all routine medications.  Our office may have instructed you to hold some medications.    As no general anesthesia was used in today's procedure, you should not experience any side effects related to anesthesia.     If you are diabetic, the steroids used in today's injection may temporarily increase your blood sugar levels after the first few days after your injection. Please keep a close eye on your sugars and alert the doctor who manages your diabetes if your sugars are significantly high from your baseline or you are symptomatic.     If you have a  problem specifically related to your procedure, please call our office at [phone].  Problems not related to your procedure should be directed to your primary care physician.

## 2025-01-20 ENCOUNTER — OFFICE VISIT (OUTPATIENT)
Dept: PHYSICAL THERAPY | Facility: CLINIC | Age: 65
End: 2025-01-20
Payer: MEDICARE

## 2025-01-20 DIAGNOSIS — M54.12 CERVICAL RADICULOPATHY: ICD-10-CM

## 2025-01-20 DIAGNOSIS — M54.2 CERVICALGIA: Primary | ICD-10-CM

## 2025-01-20 PROCEDURE — 97535 SELF CARE MNGMENT TRAINING: CPT

## 2025-01-20 PROCEDURE — 97010 HOT OR COLD PACKS THERAPY: CPT

## 2025-01-20 PROCEDURE — 97140 MANUAL THERAPY 1/> REGIONS: CPT

## 2025-01-20 PROCEDURE — 97112 NEUROMUSCULAR REEDUCATION: CPT

## 2025-01-20 NOTE — PROGRESS NOTES
"Daily Note     Today's date: 2025  Patient name: Miguel Rodgers  : 1960  MRN: [id number]  Referring provider: Chris Sharpe MD  Dx:   Encounter Diagnosis     ICD-10-CM    1. Cervicalgia  M54.2       2. Cervical radiculopathy  M54.12                      Subjective: no benefit from epidural noted on Friday.  I even have symptoms in the left arm now too, some tingling       Objective: See treatment diary below      Assessment: Tolerated treatment well. Patient has constant pain of 4/10 radiation and throbbing pain from axilla to down the ulnar nerve path.  No significant pec minor tightness or tenderness.  Mild loss of right sidebending in supine.    Pain into triceps with chin tucks and extension.  Unloading right UE helps to reduce pain    Plan:  contact MD in a week              Date     2024     Visits # EVAL  2 3  4  5      Manual                manual traction     5'            Ktape postural    5'             STM to rhomboids   5'  10' seated     Pec minor assesment       C-Spine        Gentle cervical mobilization with traction  Ulnar nerve glide LUE Cervical traction      Neuro                scap squeeze     X10  x10  10x supine  Supine x 10       c/s extension /t's ext in chair    x10  Blue 1/2 foam    Chin tucks with extension self OP  chin tuck  with overpressure      Thoracic rotation    10x 10\" R/L X10      chin tucks    x10 x10  Lx10 supine  X10 seated and supine      wand extension    x10 x10   x10  Thoracic extension on table (seated)       ball rollouts         x10      TE                                                                                               TA                                                                               Gait                                                               Modalities                                              HEP      education on HEP-ed on posture  On pillows in bed  Heat vs cold   education on " positioning of right UE  Education on cold vs heat and pain management with posture

## 2025-01-21 ENCOUNTER — TELEPHONE (OUTPATIENT)
Age: 65
End: 2025-01-21

## 2025-01-21 DIAGNOSIS — M54.12 CERVICAL RADICULOPATHY: Primary | ICD-10-CM

## 2025-01-21 RX ORDER — METHOCARBAMOL 750 MG/1
750 TABLET, FILM COATED ORAL 3 TIMES DAILY PRN
Qty: 42 TABLET | Refills: 0 | Status: SHIPPED | OUTPATIENT
Start: 2025-01-21 | End: 2025-02-04

## 2025-01-21 NOTE — TELEPHONE ENCOUNTER
Chris Sharpe MD  You2 minutes ago (10:28 AM)       Two week script of robaxin sent to patient's pharmacy. Increased dose to 750 mg and increased frequency to TID.

## 2025-01-21 NOTE — TELEPHONE ENCOUNTER
Caller: maribel Rivera     Doctor: Dell     Reason for call: pt returning nurses call     Call back#: [phone]

## 2025-01-21 NOTE — TELEPHONE ENCOUNTER
S/w pt and advised of same. Pt verbalized understanding and appreciation.     Pt would like to try an increase in the methocarbamol first and if no relief will cb for the Gabapentin increase. Pt has no se's from the gabapentin but does not feel that it has helped much at all and definitely less than the methocarbamol.  Pt advised that he is on a low dose and it can be increased to achieve benefit.     Pt requires cb when script sent.

## 2025-01-21 NOTE — TELEPHONE ENCOUNTER
S/w pt  who states that he is having B/L upper back pain that radiates to right arm, armpit, elbow that is shooting, throbbing and sharp and is extending to his left arm. Pt is s/p C7 T1 IRVIN on 1/17/25.   Pt is taking Gabapentin 400 mg tid. Pt was advised by cardiology to stop Celebrex as hews having chest pains. Pt initially had relief with Methocarbamol but has not helped since injection. Tylenol, cold and heat  and lido patches do not help. Skin is too sensitive to apply creams.  Steroid pack given this month did not help pain. Pt aware it may take 2 weeks for the full effect of the injection.  Pt is requesting any further recs from [facility].

## 2025-01-21 NOTE — TELEPHONE ENCOUNTER
Chris Sharpe MD  You9 minutes ago (9:54 AM)       Is he having side effects from gabapentin? If not, next step would be to increase to 600 mg TID in the interim. Can also increase the dose of his robaxin to see if he gets more benefit.

## 2025-01-22 ENCOUNTER — OFFICE VISIT (OUTPATIENT)
Dept: PHYSICAL THERAPY | Facility: CLINIC | Age: 65
End: 2025-01-22
Payer: MEDICARE

## 2025-01-22 DIAGNOSIS — M54.2 CERVICALGIA: Primary | ICD-10-CM

## 2025-01-22 PROCEDURE — 97140 MANUAL THERAPY 1/> REGIONS: CPT

## 2025-01-22 PROCEDURE — 97535 SELF CARE MNGMENT TRAINING: CPT

## 2025-01-22 PROCEDURE — 97112 NEUROMUSCULAR REEDUCATION: CPT

## 2025-01-22 NOTE — PROGRESS NOTES
"Daily Note     Today's date: 2025  Patient name: Miguel Rodgers  : 1960  MRN: [id number]  Referring provider: Chris Sharpe MD  Dx:   Encounter Diagnosis     ICD-10-CM    1. Cervicalgia  M54.2                      Subjective: no better.  Same.  No sleep      Objective: See treatment diary below      Assessment: Tolerated treatment well. Patient would benefit from continued PT.  Signficant difference in scalene soft tissue mobility noted right side vs left with tenderness and hypertonia noted in anterior scalene with reproduction of right hand symptoms with palpation.  Educated patient on scalene stretching for home       Plan: Continue per plan of care.              Date  2024     Visits # EVAL  2 3  4  5      Manual                manual traction  5   5'            Ktape postural    5'             STM to rhomboids Ed.  self mobs  5'  10' seated     Pec minor assesment      MFR           C-Spine  stretching to scalene      Gentle cervical mobilization with traction  Ulnar nerve glide LUE Cervical traction      Neuro                scap squeeze   x10  X10  x10  10x supine  Supine x 10       c/s extension /t's ext in chair    x10  Blue 1/2 foam    Chin tucks with extension self OP  chin tuck  with overpressure      Thoracic rotation    10x 10\" R/L X10      chin tucks  x10  x10 x10  Lx10 supine  X10 seated and supine      wand extension    x10 x10   x10  Thoracic extension on table (seated)       ball rollouts   x10      x10      Scalene stretching 10\" x 3 way supine        PIN and stretch  UT and scalene with str. Cane x 10\" x  3        TE                                                                                               TA                                                                               Gait                                                               Modalities                                              HEP      education on HEP-ed on " posture  On pillows in bed  Heat vs cold   education on positioning of right UE  Education on cold vs heat and pain management with posture

## 2025-01-27 ENCOUNTER — OFFICE VISIT (OUTPATIENT)
Dept: PHYSICAL THERAPY | Facility: CLINIC | Age: 65
End: 2025-01-27
Payer: MEDICARE

## 2025-01-27 DIAGNOSIS — M54.12 CERVICAL RADICULOPATHY: ICD-10-CM

## 2025-01-27 DIAGNOSIS — M54.2 CERVICALGIA: Primary | ICD-10-CM

## 2025-01-27 PROCEDURE — 97112 NEUROMUSCULAR REEDUCATION: CPT

## 2025-01-27 PROCEDURE — 97535 SELF CARE MNGMENT TRAINING: CPT

## 2025-01-27 PROCEDURE — 97140 MANUAL THERAPY 1/> REGIONS: CPT

## 2025-01-27 NOTE — TELEPHONE ENCOUNTER
Caller: Miguel     Doctor: MARGY    Reason for call: pt advised that he is still in a lot of pt and has no had any improvement. Pt would like to discuss with the nurse what he can do for pain relief     Call back#: [phone]

## 2025-01-27 NOTE — PROGRESS NOTES
"Daily Note     Today's date: 2025  Patient name: Miguel Rodgers  : 1960  MRN: [id number]  Referring provider: Chris Sharpe MD  Dx:   Encounter Diagnosis     ICD-10-CM    1. Cervicalgia  M54.2       2. Cervical radiculopathy  M54.12           Start Time: 0845  Stop Time: 0930  Total time in clinic (min): 45 minutes    Subjective: still in a lot of pain right arm is so sensitive, I did some of the exercises at home, but I can't sleep at al and I was awake today at 1am      Objective: See treatment diary below      Assessment: Tolerated treatment well. Patient would benefit from continued PT. But occasional random sharp stabbing pains into left tricep with all activiities regardless of position      Plan: Continue per plan of care.              Date  2024     Visits # EVAL  2 3  4  5      Manual                manual traction  5   5'            Ktape postural    5'             STM to rhomboids Ed.  self mobs  5'  IASTM 10' seated     Pec minor assesment      MFR   To scalenes IASTM        C-Spine  stretching to scalene      Gentle cervical mobilization with traction  Ulnar nerve glide LUE Cervical traction      Neuro                scap squeeze   x10  X10  x10  10x supine  Supine x 10       c/s extension /t's ext in chair    x10  Blue 1/2 foam    Chin tucks with extension self OP  chin tuck  with overpressure      Thoracic rotation  X10 seated  10x 10\" R/L X10      chin tucks  x10  x10 x10  Lx10 supine  X10 seated and supine      wand extension    x10 x10   x10  Thoracic extension on table (seated)       ball rollouts   x10  x10    x10      Scalene stretching 10\" x 3 way supine        PIN and stretch  UT and scalene with str. Cane x 10\" x  3 3 way scalene stretch  With SOS       TE                                                                                               TA                                                                               Gait     "                                                           Modalities                                              HEP      education on HEP-ed on posture  On pillows in bed  Heat vs cold   education on positioning of right UE  Education on cold vs heat and pain management with posture

## 2025-01-27 NOTE — TELEPHONE ENCOUNTER
S/w pt and his wife, pt is s/p IRVIN 1/17/25, with no relief. RN reminded pt it can take up to 2 weeks for the full effects. RN suggested Tylenol up to 3000 mg daily to help with the pain in the mean time. Pt's wife mentioned they are going to look into CBD creams/drops as well. Aware we will call Friday with an update.

## 2025-01-28 ENCOUNTER — APPOINTMENT (OUTPATIENT)
Dept: LAB | Facility: CLINIC | Age: 65
End: 2025-01-28
Payer: MEDICARE

## 2025-01-28 ENCOUNTER — OFFICE VISIT (OUTPATIENT)
Dept: FAMILY MEDICINE CLINIC | Facility: CLINIC | Age: 65
End: 2025-01-28
Payer: MEDICARE

## 2025-01-28 VITALS
DIASTOLIC BLOOD PRESSURE: 84 MMHG | TEMPERATURE: 99.5 F | BODY MASS INDEX: 28.83 KG/M2 | HEART RATE: 89 BPM | RESPIRATION RATE: 18 BRPM | WEIGHT: 179.4 LBS | HEIGHT: 66 IN | OXYGEN SATURATION: 96 % | SYSTOLIC BLOOD PRESSURE: 118 MMHG

## 2025-01-28 DIAGNOSIS — C34.12 MALIGNANT NEOPLASM OF UPPER LOBE, LEFT BRONCHUS OR LUNG (HCC): ICD-10-CM

## 2025-01-28 DIAGNOSIS — J44.9 CHRONIC OBSTRUCTIVE PULMONARY DISEASE, UNSPECIFIED COPD TYPE (HCC): ICD-10-CM

## 2025-01-28 DIAGNOSIS — M54.16 LUMBAR RADICULOPATHY: ICD-10-CM

## 2025-01-28 DIAGNOSIS — E53.8 B12 DEFICIENCY: ICD-10-CM

## 2025-01-28 DIAGNOSIS — Z13.29 THYROID DISORDER SCREENING: ICD-10-CM

## 2025-01-28 DIAGNOSIS — E04.2 MULTINODULAR GOITER: ICD-10-CM

## 2025-01-28 DIAGNOSIS — R79.89 ABNORMAL TSH: ICD-10-CM

## 2025-01-28 DIAGNOSIS — T45.1X5A AGRANULOCYTOSIS SECONDARY TO CANCER CHEMOTHERAPY (HCC): ICD-10-CM

## 2025-01-28 DIAGNOSIS — D70.1 AGRANULOCYTOSIS SECONDARY TO CANCER CHEMOTHERAPY (HCC): ICD-10-CM

## 2025-01-28 DIAGNOSIS — Z13.0 SCREENING, IRON DEFICIENCY ANEMIA: ICD-10-CM

## 2025-01-28 DIAGNOSIS — E55.9 VITAMIN D DEFICIENCY: ICD-10-CM

## 2025-01-28 DIAGNOSIS — F51.01 PRIMARY INSOMNIA: ICD-10-CM

## 2025-01-28 DIAGNOSIS — I20.81 ANGINA PECTORIS WITH CORONARY MICROVASCULAR DYSFUNCTION (HCC): ICD-10-CM

## 2025-01-28 DIAGNOSIS — E87.8 ELECTROLYTE ABNORMALITY: ICD-10-CM

## 2025-01-28 DIAGNOSIS — Z12.5 PROSTATE CANCER SCREENING: ICD-10-CM

## 2025-01-28 DIAGNOSIS — M54.12 CERVICAL RADICULOPATHY: ICD-10-CM

## 2025-01-28 DIAGNOSIS — Z00.00 MEDICARE ANNUAL WELLNESS VISIT, SUBSEQUENT: Primary | ICD-10-CM

## 2025-01-28 PROBLEM — R49.0 HOARSENESS: Status: RESOLVED | Noted: 2024-10-28 | Resolved: 2025-01-28

## 2025-01-28 PROBLEM — E04.9 SUBSTERNAL GOITER: Status: RESOLVED | Noted: 2024-10-29 | Resolved: 2025-01-28

## 2025-01-28 PROBLEM — J39.8 TRACHEAL DEVIATION: Status: RESOLVED | Noted: 2024-09-24 | Resolved: 2025-01-28

## 2025-01-28 LAB
25(OH)D3 SERPL-MCNC: 46 NG/ML (ref 30–100)
ALBUMIN SERPL BCG-MCNC: 4.3 G/DL (ref 3.5–5)
ALP SERPL-CCNC: 44 U/L (ref 34–104)
ALT SERPL W P-5'-P-CCNC: 36 U/L (ref 7–52)
ANION GAP SERPL CALCULATED.3IONS-SCNC: 10 MMOL/L (ref 4–13)
AST SERPL W P-5'-P-CCNC: 22 U/L (ref 13–39)
BASOPHILS # BLD AUTO: 0.03 THOUSANDS/ΜL (ref 0–0.1)
BASOPHILS NFR BLD AUTO: 0 % (ref 0–1)
BILIRUB SERPL-MCNC: 0.56 MG/DL (ref 0.2–1)
BUN SERPL-MCNC: 21 MG/DL (ref 5–25)
CALCIUM SERPL-MCNC: 9.7 MG/DL (ref 8.4–10.2)
CHLORIDE SERPL-SCNC: 105 MMOL/L (ref 96–108)
CO2 SERPL-SCNC: 26 MMOL/L (ref 21–32)
CREAT SERPL-MCNC: 0.68 MG/DL (ref 0.6–1.3)
EOSINOPHIL # BLD AUTO: 0.21 THOUSAND/ΜL (ref 0–0.61)
EOSINOPHIL NFR BLD AUTO: 3 % (ref 0–6)
ERYTHROCYTE [DISTWIDTH] IN BLOOD BY AUTOMATED COUNT: 14.1 % (ref 11.6–15.1)
GFR SERPL CREATININE-BSD FRML MDRD: 99 ML/MIN/1.73SQ M
GLUCOSE P FAST SERPL-MCNC: 95 MG/DL (ref 65–99)
HCT VFR BLD AUTO: 44 % (ref 36.5–49.3)
HGB BLD-MCNC: 14.6 G/DL (ref 12–17)
IMM GRANULOCYTES # BLD AUTO: 0.03 THOUSAND/UL (ref 0–0.2)
IMM GRANULOCYTES NFR BLD AUTO: 0 % (ref 0–2)
LYMPHOCYTES # BLD AUTO: 1.66 THOUSANDS/ΜL (ref 0.6–4.47)
LYMPHOCYTES NFR BLD AUTO: 23 % (ref 14–44)
MCH RBC QN AUTO: 31.7 PG (ref 26.8–34.3)
MCHC RBC AUTO-ENTMCNC: 33.2 G/DL (ref 31.4–37.4)
MCV RBC AUTO: 95 FL (ref 82–98)
MONOCYTES # BLD AUTO: 0.92 THOUSAND/ΜL (ref 0.17–1.22)
MONOCYTES NFR BLD AUTO: 13 % (ref 4–12)
NEUTROPHILS # BLD AUTO: 4.53 THOUSANDS/ΜL (ref 1.85–7.62)
NEUTS SEG NFR BLD AUTO: 61 % (ref 43–75)
NRBC BLD AUTO-RTO: 0 /100 WBCS
PLATELET # BLD AUTO: 287 THOUSANDS/UL (ref 149–390)
PMV BLD AUTO: 10.4 FL (ref 8.9–12.7)
POTASSIUM SERPL-SCNC: 4.3 MMOL/L (ref 3.5–5.3)
PROT SERPL-MCNC: 7 G/DL (ref 6.4–8.4)
PSA SERPL-MCNC: 0.17 NG/ML (ref 0–4)
RBC # BLD AUTO: 4.61 MILLION/UL (ref 3.88–5.62)
SODIUM SERPL-SCNC: 141 MMOL/L (ref 135–147)
T4 FREE SERPL-MCNC: 0.76 NG/DL (ref 0.61–1.12)
TSH SERPL DL<=0.05 MIU/L-ACNC: 0.07 UIU/ML (ref 0.45–4.5)
VIT B12 SERPL-MCNC: 324 PG/ML (ref 180–914)
WBC # BLD AUTO: 7.38 THOUSAND/UL (ref 4.31–10.16)

## 2025-01-28 PROCEDURE — 85025 COMPLETE CBC W/AUTO DIFF WBC: CPT

## 2025-01-28 PROCEDURE — G0438 PPPS, INITIAL VISIT: HCPCS | Performed by: STUDENT IN AN ORGANIZED HEALTH CARE EDUCATION/TRAINING PROGRAM

## 2025-01-28 PROCEDURE — 82607 VITAMIN B-12: CPT

## 2025-01-28 PROCEDURE — 99204 OFFICE O/P NEW MOD 45 MIN: CPT | Performed by: STUDENT IN AN ORGANIZED HEALTH CARE EDUCATION/TRAINING PROGRAM

## 2025-01-28 PROCEDURE — 82306 VITAMIN D 25 HYDROXY: CPT

## 2025-01-28 PROCEDURE — G0103 PSA SCREENING: HCPCS

## 2025-01-28 PROCEDURE — 80053 COMPREHEN METABOLIC PANEL: CPT

## 2025-01-28 PROCEDURE — 84443 ASSAY THYROID STIM HORMONE: CPT

## 2025-01-28 PROCEDURE — 84439 ASSAY OF FREE THYROXINE: CPT

## 2025-01-28 PROCEDURE — 36415 COLL VENOUS BLD VENIPUNCTURE: CPT

## 2025-01-28 RX ORDER — TRAZODONE HYDROCHLORIDE 50 MG/1
50 TABLET, FILM COATED ORAL
Qty: 30 TABLET | Refills: 0 | Status: SHIPPED | OUTPATIENT
Start: 2025-01-28 | End: 2025-01-31

## 2025-01-28 NOTE — PROGRESS NOTES
Name: Miguel Rodgers      : 1960      MRN: [id number]  Encounter Provider: Orlando Eduardo DO  Encounter Date: 2025   Encounter department: [facility]    Assessment & Plan  Medicare annual wellness visit, subsequent  Medicare annual wellness completed in office today, follow-up blood work, follow-up specialty support       Agranulocytosis secondary to cancer chemotherapy (HCC)  HCC documentation, most recent CBC appropriate       Chronic obstructive pulmonary disease, unspecified COPD type (HCC)  Stable, lungs clear to auscultation bilaterally       Malignant neoplasm of upper lobe, left bronchus or lung (HCC)  Follows with oncology team, appreciate recommendations       Angina pectoris with coronary microvascular dysfunction (HCC)  History noted, medications reviewed       Multinodular goiter  Follow-up TSH/free T4, biopsy pending       Cervical radiculopathy  Chronic, continue conservative management as discussed       Lumbar radiculopathy  Chronic, continue conservative management as discussed, Voltaren gel, light stretching/strengthening with physical therapy recommendations appreciated, continue gabapentin, methocarbamol, patient will try CBD, hold off on escalation of pain medication based on conversation       Electrolyte abnormality    Orders:  •  Comprehensive metabolic panel; Future    Screening, iron deficiency anemia    Orders:  •  CBC and differential; Future    Thyroid disorder screening    Orders:  •  T4, free; Future  •  TSH, 3rd generation; Future    Vitamin D deficiency    Orders:  •  Vitamin D 25 hydroxy; Future    B12 deficiency    Orders:  •  Vitamin B12; Future    Prostate cancer screening    Orders:  •  PSA, Total Screen; Future    Primary insomnia  Trial trazodone to help with primary insomnia  Orders:  •  traZODone (DESYREL) 50 mg tablet; Take 1 tablet (50 mg total) by mouth daily at bedtime as needed for sleep      Depression Screening and Follow-up  Plan: Patient was screened for depression during today's encounter. They screened negative with a PHQ-2 score of 0.      Preventive health issues were discussed with patient, and age appropriate screening tests were ordered as noted in patient's After Visit Summary. Personalized health advice and appropriate referrals for health education or preventive services given if needed, as noted in patient's After Visit Summary.    History of Present Illness     Patient is a pleasant 64-year-old male coming in for new patient encounter, Medicare annual wellness follow-up.  Medications reviewed, medical history reviewed.       Patient Care Team:  Orlando Eduardo DO as PCP - General (Family Medicine)    Review of Systems   Constitutional:  Negative for chills and fever.   HENT:  Negative for ear pain and sore throat.    Eyes:  Negative for pain and visual disturbance.   Respiratory:  Negative for cough and shortness of breath.    Cardiovascular:  Negative for chest pain and palpitations.   Gastrointestinal:  Negative for abdominal pain, constipation, diarrhea, nausea and vomiting.   Genitourinary:  Negative for dysuria and hematuria.   Musculoskeletal:  Positive for back pain, neck pain and neck stiffness. Negative for arthralgias.   Skin:  Negative for color change and rash.   Neurological:  Negative for dizziness, seizures, syncope and headaches.   Psychiatric/Behavioral:  Negative for agitation, behavioral problems and confusion.    All other systems reviewed and are negative.    Medical History Reviewed by provider this encounter:  Tobacco  Allergies  Meds  Problems  Med Hx  Surg Hx  Fam Hx       Annual Wellness Visit Questionnaire   Miguel is here for his Subsequent Wellness visit. Last Medicare Wellness visit information reviewed, patient interviewed and updates made to the record today.      Health Risk Assessment:   Patient rates overall health as good. Patient feels that their physical health rating is  slightly worse. Patient is satisfied with their life. Eyesight was rated as same. Hearing was rated as same. Patient feels that their emotional and mental health rating is same. Patients states they are never, rarely angry. Patient states they are never, rarely unusually tired/fatigued. Pain experienced in the last 7 days has been a lot. Patient's pain rating has been 7/10. Patient states that he has experienced no weight loss or gain in last 6 months. Back pain      Depression Screening:   PHQ-2 Score: 0      Fall Risk Screening:   In the past year, patient has experienced: no history of falling in past year      Home Safety:  Patient does not have trouble with stairs inside or outside of their home. Patient has working smoke alarms and has working carbon monoxide detector. Home safety hazards include: none.     Nutrition:   Current diet is Regular.     Medications:   Patient is not currently taking any over-the-counter supplements. Patient is able to manage medications.     Activities of Daily Living (ADLs)/Instrumental Activities of Daily Living (IADLs):   Walk and transfer into and out of bed and chair?: Yes  Dress and groom yourself?: Yes    Bathe or shower yourself?: Yes    Feed yourself? Yes  Do your laundry/housekeeping?: Yes  Manage your money, pay your bills and track your expenses?: Yes  Make your own meals?: Yes    Do your own shopping?: Yes    Previous Hospitalizations:   Any hospitalizations or ED visits within the last 12 months?: No      Advance Care Planning:   Living will: No    Durable POA for healthcare: No    Advanced directive: No    Advanced directive counseling given: Yes      Cognitive Screening:   Provider or family/friend/caregiver concerned regarding cognition?: No    PREVENTIVE SCREENINGS      Cardiovascular Screening:    General: Screening Current      Diabetes Screening:     General: Screening Current      Colorectal Cancer Screening:     General: Screening Current      Prostate Cancer  Screening:    General: Risks and Benefits Discussed    Due for: PSA      Osteoporosis Screening:    General: Screening Not Indicated      Abdominal Aortic Aneurysm (AAA) Screening:    Risk factors include: age between 65-74 yo        General: Screening Current      Lung Cancer Screening:     General: Screening Not Indicated and History Lung Cancer      Hepatitis C Screening:    General: Screening Current    Screening, Brief Intervention, and Referral to Treatment (SBIRT)    Screening  Typical number of drinks in a day: 0  Typical number of drinks in a week: 0  Interpretation: Low risk drinking behavior.    Single Item Drug Screening:  How often have you used an illegal drug (including marijuana) or a prescription medication for non-medical reasons in the past year? never    Single Item Drug Screen Score: 0  Interpretation: Negative screen for possible drug use disorder    Brief Intervention  Alcohol & drug use screenings were reviewed. No concerns regarding substance use disorder identified.     Other Counseling Topics:   Car/seat belt/driving safety, skin self-exam, sunscreen and calcium and vitamin D intake and regular weightbearing exercise.     Social Drivers of Health     Food Insecurity: No Food Insecurity (1/28/2025)    Hunger Vital Sign    • Worried About Running Out of Food in the Last Year: Never true    • Ran Out of Food in the Last Year: Never true   Transportation Needs: No Transportation Needs (1/28/2025)    PRAPARE - Transportation    • Lack of Transportation (Medical): No    • Lack of Transportation (Non-Medical): No   Housing Stability: Low Risk  (1/28/2025)    Housing Stability Vital Sign    • Unable to Pay for Housing in the Last Year: No    • Number of Times Moved in the Last Year: 0    • Homeless in the Last Year: No   Utilities: Not At Risk (1/28/2025)    [facility] Utilities    • Threatened with loss of utilities: No     No results found.    Objective   /84 (BP Location: Left arm, Patient  "Position: Sitting, Cuff Size: Standard)   Pulse 89   Temp 99.5 °F (37.5 °C) (Temporal)   Resp 18   Ht 5' 6\" (1.676 m)   Wt 81.4 kg (179 lb 6.4 oz)   SpO2 96%   BMI 28.96 kg/m²     Physical Exam  Vitals and nursing note reviewed.   Constitutional:       General: He is not in acute distress.     Appearance: He is well-developed.   HENT:      Head: Normocephalic and atraumatic.   Eyes:      General: No scleral icterus.     Conjunctiva/sclera: Conjunctivae normal.   Cardiovascular:      Rate and Rhythm: Normal rate and regular rhythm.      Pulses: Normal pulses.      Heart sounds: No murmur heard.  Pulmonary:      Effort: Pulmonary effort is normal. No respiratory distress.      Breath sounds: Normal breath sounds.   Abdominal:      General: Bowel sounds are normal. There is no distension.      Palpations: Abdomen is soft.      Tenderness: There is no abdominal tenderness.   Musculoskeletal:         General: No swelling. Normal range of motion.      Cervical back: Neck supple.   Skin:     General: Skin is warm and dry.      Capillary Refill: Capillary refill takes less than 2 seconds.      Coloration: Skin is not jaundiced.   Neurological:      General: No focal deficit present.      Mental Status: He is alert and oriented to person, place, and time. Mental status is at baseline.   Psychiatric:         Mood and Affect: Mood normal.         Behavior: Behavior normal.         "

## 2025-01-28 NOTE — ASSESSMENT & PLAN NOTE
Chronic, continue conservative management as discussed, Voltaren gel, light stretching/strengthening with physical therapy recommendations appreciated, continue gabapentin, methocarbamol, patient will try CBD, hold off on escalation of pain medication based on conversation

## 2025-01-29 ENCOUNTER — OFFICE VISIT (OUTPATIENT)
Dept: PHYSICAL THERAPY | Facility: CLINIC | Age: 65
End: 2025-01-29
Payer: MEDICARE

## 2025-01-29 DIAGNOSIS — M54.2 CERVICALGIA: Primary | ICD-10-CM

## 2025-01-29 DIAGNOSIS — M54.12 CERVICAL RADICULOPATHY: ICD-10-CM

## 2025-01-29 PROCEDURE — 97112 NEUROMUSCULAR REEDUCATION: CPT

## 2025-01-29 PROCEDURE — 97140 MANUAL THERAPY 1/> REGIONS: CPT

## 2025-01-29 NOTE — PROGRESS NOTES
"Daily Note     Today's date: 2025  Patient name: Miguel Rodgers  : 1960  MRN: [id number]  Referring provider: Chris Sharpe MD  Dx:   Encounter Diagnosis     ICD-10-CM    1. Cervicalgia  M54.2       2. Cervical radiculopathy  M54.12                      Subjective: I went to Dr. Eduardo and he gave me sleeping meds and that is helping      Objective: See treatment diary below      Assessment: Tolerated treatment well. Patient would benefit from continued PT.  Decreased soft tissue tightness with scalenes noted and decreased sharp pains noted today.  Maybe positive benefit to sleep last night.       Plan: Continue per plan of care.              Date  2024     Visits # EVAL  2 3  4  5      Manual                manual traction  5   5' 5           Ktape postural    5'  5           STM to rhomboids Ed.  self mobs  5'  IASTM --   Pec minor assesment      MFR   To scalenes IASTM Scalenes       C-Spine  stretching to scalene    stretching to scalenes  Gentle cervical mobilization with traction  Ulnar nerve glide LUE Cervical traction      Neuro                scap squeeze   x10  X10  x10  10x supine  Supine x 10       c/s extension /t's ext in chair    x10  Blue 1/2 foam --   Chin tucks with extension self OP  chin tuck  with overpressure      Thoracic rotation  X10 seated x10 10x 10\" R/L X10      chin tucks  x10  x10 x10  Lx10 supine  X10 seated and supine      wand extension    x10 x10   x10  Thoracic extension on table (seated)       ball rollouts   x10  x10  x10\"x 3\"  x10      Scalene stretching 10\" x 3 way supine        PIN and stretch  UT and scalene with str. Cane x 10\" x  3 3 way scalene stretch  With SOS 3 way scalene stretch with SOS      Band rows/ext  x20 x20      Doorway pec stretch  10\" x6 10\" x6      TE                                                                                               TA                                                       "                         Gait                                                               Modalities                                              HEP      education on HEP-ed on posture  On pillows in bed  Heat vs cold   education on positioning of right UE  Education on cold vs heat and pain management with posture

## 2025-01-31 ENCOUNTER — RESULTS FOLLOW-UP (OUTPATIENT)
Dept: FAMILY MEDICINE CLINIC | Facility: CLINIC | Age: 65
End: 2025-01-31

## 2025-01-31 ENCOUNTER — DOCUMENTATION (OUTPATIENT)
Dept: FAMILY MEDICINE CLINIC | Facility: CLINIC | Age: 65
End: 2025-01-31

## 2025-01-31 ENCOUNTER — TELEPHONE (OUTPATIENT)
Dept: PAIN MEDICINE | Facility: CLINIC | Age: 65
End: 2025-01-31

## 2025-01-31 ENCOUNTER — TELEPHONE (OUTPATIENT)
Age: 65
End: 2025-01-31

## 2025-01-31 DIAGNOSIS — F51.01 PRIMARY INSOMNIA: Primary | ICD-10-CM

## 2025-01-31 DIAGNOSIS — M54.12 CERVICAL RADICULOPATHY: Primary | ICD-10-CM

## 2025-01-31 RX ORDER — DOXEPIN HYDROCHLORIDE 10 MG/1
10 CAPSULE ORAL
Qty: 30 CAPSULE | Refills: 0 | Status: SHIPPED | OUTPATIENT
Start: 2025-01-31 | End: 2025-03-02

## 2025-01-31 RX ORDER — GABAPENTIN 600 MG/1
600 TABLET ORAL 3 TIMES DAILY
Qty: 90 TABLET | Refills: 0 | Status: SHIPPED | OUTPATIENT
Start: 2025-01-31 | End: 2025-03-02

## 2025-01-31 NOTE — TELEPHONE ENCOUNTER
Caller: Miguel    Doctor: Dr. Sharpe    Reason for call: returning nurses phone call    Call back#: [phone]

## 2025-01-31 NOTE — TELEPHONE ENCOUNTER
Caller: Miguel     Doctor: Dr. JI    Reason for call: pt advised that they have some additional questions for the nurse     Call back#: [phone]

## 2025-01-31 NOTE — TELEPHONE ENCOUNTER
Patient returned call regarding labs, relayed message patient had no further questions.     Patient had stated he was recently put on medication to help him sleep and it is not working all of the time. Patient stated the medication had worked before but did not work for him last night. Patient asked what he should do, if there is an alternative or if he should continue taking current medication.    Please advise, thank you

## 2025-01-31 NOTE — TELEPHONE ENCOUNTER
Caller: patient    Doctor: Dr. JI    Reason for call: Pt reports  0% improvement post injection  Pain level  7/10     Patient would like to know recommendation for his pain, having hard time sleeping due to his pain.    Sleeping meds are not helping due to pain being sever     Call back#:

## 2025-01-31 NOTE — TELEPHONE ENCOUNTER
MD Gardenia Hurst now (2:08 PM)       We could also trial one other injection going the level above. After two injections, typically my stance is to refer to Neurosurgery.

## 2025-01-31 NOTE — TELEPHONE ENCOUNTER
S/w pt and advised of same. Pt will increased q 3 days til taking 600 mg tid. Pt aware to contact the office if any drowsiness, dizziness or blurred vision that does not subside. Pt will avoid driving  or operating machinery until he is sure how the medication will affect him.   Pt will contact the office with any further questions.   Pt verbalized understanding and appreciative of call.

## 2025-01-31 NOTE — TELEPHONE ENCOUNTER
Chris Sharpe MD  You3 minutes ago (2:32 PM)       Will increase to 600 mg TID and send new script.

## 2025-01-31 NOTE — TELEPHONE ENCOUNTER
S/w pt and wife(verbal consent) advised of same. Pt had been prescribed Tramadol 50 mg by oncology and did not feel that it helped his pain. Is there another option for pain med?    Pt amenable to NSU referral but concerned as he feels that is a last resort option    Advised will notify SJ and cb

## 2025-01-31 NOTE — TELEPHONE ENCOUNTER
S/w pt and wife who wanted [facility] to be aware that the pain is radiating from between the shoulder blades to the armpit, down the right lateral arm to the pinky finger and ring finger and are hopeful that the ordered injection will help this pain

## 2025-01-31 NOTE — TELEPHONE ENCOUNTER
Attempted to reach pt, LMOM with CB# and OH.     MD Mimi Hurst; [facility] minutes ago (1:04 PM)       If patient amenable can do a short script of low dose tramadol. Will also place a referral for him to see NSU.

## 2025-01-31 NOTE — TELEPHONE ENCOUNTER
S/w pt  and wife and advised of same. Pt amenable to another injection and if no improvement, will see NSU.    Pt denies blood thinners or diabetes.  Pt advised he will be contacted to schedule procedure.     Pt presently taking Gabapentin 400 mg tid. Pt is requesting refill and increase in dose to hopefully help the sharp pain between shoulder blades and arms, R>L. Pt denies Se's. Pt does not feel it has helped so far.   Please advise on refill.

## 2025-01-31 NOTE — TELEPHONE ENCOUNTER
Relayed results to patient as per provider message. Patient/Patient Representative expressed understanding and did not have any further questions.

## 2025-02-03 ENCOUNTER — OFFICE VISIT (OUTPATIENT)
Dept: PHYSICAL THERAPY | Facility: CLINIC | Age: 65
End: 2025-02-03
Payer: MEDICARE

## 2025-02-03 ENCOUNTER — TELEPHONE (OUTPATIENT)
Age: 65
End: 2025-02-03

## 2025-02-03 DIAGNOSIS — M54.12 CERVICAL RADICULOPATHY: Primary | ICD-10-CM

## 2025-02-03 DIAGNOSIS — M54.12 CERVICAL RADICULOPATHY: ICD-10-CM

## 2025-02-03 DIAGNOSIS — M54.2 CERVICALGIA: Primary | ICD-10-CM

## 2025-02-03 PROCEDURE — 97140 MANUAL THERAPY 1/> REGIONS: CPT

## 2025-02-03 PROCEDURE — 97112 NEUROMUSCULAR REEDUCATION: CPT

## 2025-02-03 NOTE — TELEPHONE ENCOUNTER
S/w pt, he said the Robaxin 750 mg is not helping and is not being covered by insurance. Pt is requesting to trial a different muscle relaxer if possible. Pt does not recall trying any other muscle relaxers in the past.     Pt also asked if the order was in for his repeat injection at a different level, please specify which level you would like to proceed with.

## 2025-02-03 NOTE — TELEPHONE ENCOUNTER
Chris Sharpe MD  You3 days ago       Thank you for the update! I hope the increased dose of gabapentin and injection helps.

## 2025-02-03 NOTE — TELEPHONE ENCOUNTER
Patient called the RX Refill Line. Message is being forwarded to the office.     Patient called stating that the increase for his Methocarbamol 750 mg did not seem to be working and he wanted to know if he should go back to the 500 mg. Also his insurance sent him a letter stating that the Methocarbamol is not covered and he wanted to see if there is a comparable drug. Please contact patient to discuss this further. Thank you.    Please contact patient at [phone]

## 2025-02-03 NOTE — PROGRESS NOTES
"Daily Note     Today's date: 2/3/2025  Patient name: Miguel Rodgers  : 1960  MRN: [id number]  Referring provider: Suresh Laird DO  Dx:   Encounter Diagnosis     ICD-10-CM    1. Cervicalgia  M54.2       2. Cervical radiculopathy  M54.12                      Subjective: I am going for epidural next week, no better yet.  Intermittent sharp stabbing right UE pain but still constant left arm pit and ulnar n. Pain in left         Objective: See treatment diary below      Assessment: Tolerated treatment well. Patient demonstrated fatigue post treatment able to tolerate nerve glide as per HEP below.  Minimal ULTT right ulnar nerve and all others are normal in supine positional testing       Plan: Continue per plan of care.              Date  1/22/25   1/27/25  1/29/25  2/3/25    1/20/25     Visits # EVAL  2 3  4  5      Manual                manual traction  5   5' 5   5        Ktape postural    5'  5   -        STM to rhomboids Ed.  self mobs  5'  IASTM --  sclene MFR Pec minor assesment      MFR   To scalenes IASTM Scalenes       C-Spine  stretching to scalene    stretching to scalenes  Gentle cervical mobilization with traction  Ulnar /median and radial nerve glide LUE Cervical traction      Neuro                scap squeeze   x10  X10  x10  10x supine  Supine x 10       c/s extension /t's ext in chair    x10  Blue 1/2 foam --   Chin tucks with extension self OP  chin tuck  with overpressure      Thoracic rotation  X10 seated x10 10x 10\" R/L X10      chin tucks  x10  x10 x10  Lx10 supine  X10 seated and supine      wand extension    x10 x10   x10  Thoracic extension on table (seated)       ball rollouts   x10  x10  x10\"x 3\" x10 x10      Scalene stretching 10\" x 3 way supine        PIN and stretch  UT and scalene with str. Cane x 10\" x  3 3 way scalene stretch  With SOS 3 way scalene stretch with SOS 3 way scalene stretch     Band rows/ext  x20 x20 X20- ytb      Doorway pec stretch  10\" x6 10\" x6 10\" x 6   "   TE                                                                                               TA                                                                               Gait                                                               Modalities                                              HEP      education on HEP-ed on posture  On pillows in bed  Heat vs cold   education on positioning of right UE  Education on cold vs heat and pain management with posture        Access Code: [email or web address]  URL: [email or web address]  Date: 02/03/2025  Prepared by: Amado Quiroz    Exercises  - Standing Median Nerve Glide  - 1 x daily - 7 x weekly - 1 sets - 10 reps  - Ulnar Nerve Mobilization - Low Level  - 1 x daily - 7 x weekly - 1 sets - 10 reps  - Radial Nerve Flossing  - 1 x daily - 7 x weekly - 1 sets - 10 reps                            Negative Screen

## 2025-02-04 ENCOUNTER — APPOINTMENT (OUTPATIENT)
Dept: PHYSICAL THERAPY | Facility: CLINIC | Age: 65
End: 2025-02-04
Payer: MEDICARE

## 2025-02-06 ENCOUNTER — APPOINTMENT (OUTPATIENT)
Dept: PHYSICAL THERAPY | Facility: CLINIC | Age: 65
End: 2025-02-06
Payer: MEDICARE

## 2025-02-11 ENCOUNTER — OFFICE VISIT (OUTPATIENT)
Dept: PHYSICAL THERAPY | Facility: CLINIC | Age: 65
End: 2025-02-11
Payer: MEDICARE

## 2025-02-11 DIAGNOSIS — M54.2 CERVICALGIA: Primary | ICD-10-CM

## 2025-02-11 PROCEDURE — 97140 MANUAL THERAPY 1/> REGIONS: CPT

## 2025-02-11 PROCEDURE — 97112 NEUROMUSCULAR REEDUCATION: CPT

## 2025-02-11 NOTE — PROGRESS NOTES
"Daily Note     Today's date: 2025  Patient name: Miguel Rodgers  : 1960  MRN: [id number]  Referring provider: Suresh Laird DO  Dx:   Encounter Diagnosis     ICD-10-CM    1. Cervicalgia  M54.2           Start Time: 0800  Stop Time: 0845  Total time in clinic (min): 45 minutes    Subjective: continue to have pain in the shoulders and arms left and right.  Getting epidural Monday and I need a neuro appointment soon    Objective: See treatment diary below      Assessment: Tolerated treatment well. Patient would benefit from continued PT.  Moderate hypertonic tissue right side mid thoracic spine vs left , noted also right rotation of mid thoracic spine      Plan: Continue per plan of care.              Date  1/22/25   1/27/25  1/29/25  2/3/25    2/11/25     Visits # EVAL  2 3  4  5      Manual                manual traction  5   5' 5   5        Ktape postural    5'  5   -        STM to rhomboids Ed.  self mobs  5'  IASTM --  sclene MFR      MFR   To scalenes IASTM Scalenes       C-Spine  stretching to scalene    stretching to scalenes  Gentle cervical mobilization with traction  Ulnar /median and radial nerve glide LUE STM to  rhomoboids and medial scap border right     Neuro                scap squeeze   x10  X10  x10  10x supine  Supine x 10       c/s extension /t's ext in chair    x10  Blue 1/2 foam --   Chin tucks with extension self OP  chin tuck  with overpressure      Thoracic rotation  X10 seated x10 10x 10\" R/L X10      chin tucks  x10  x10 x10  Lx10 supine  X10 seated and supine      wand extension    x10 x10   x10  Thoracic extension on table (seated)       ball rollouts   x10  x10  x10\"x 3\" x10 x10      Scalene stretching 10\" x 3 way supine        PIN and stretch  UT and scalene with str. Cane x 10\" x  3 3 way scalene stretch  With SOS 3 way scalene stretch with SOS 3 way scalene stretch Scalene stretch    Band rows/ext  x20 x20 X20- ytb  X20 ytb    Doorway pec stretch  10\" x6 10\" x6 10\" " x 6 10'x 6    Seated waiters tip          ytb x 20                                                                                     TA                                                                               Gait                                                               Modalities                                              HEP      education on HEP-ed on posture  On pillows in bed  Heat vs cold   education on positioning of right UE  Education on cold vs heat and pain management with posture        Access Code: [email or web address]  URL: [email or web address]  Date: 02/03/2025  Prepared by: Amado Quiroz    Exercises  - Standing Median Nerve Glide  - 1 x daily - 7 x weekly - 1 sets - 10 reps  - Ulnar Nerve Mobilization - Low Level  - 1 x daily - 7 x weekly - 1 sets - 10 reps  - Radial Nerve Flossing  - 1 x daily - 7 x weekly - 1 sets - 10 reps

## 2025-02-13 ENCOUNTER — OFFICE VISIT (OUTPATIENT)
Dept: PHYSICAL THERAPY | Facility: CLINIC | Age: 65
End: 2025-02-13
Payer: MEDICARE

## 2025-02-13 DIAGNOSIS — M54.2 CERVICALGIA: Primary | ICD-10-CM

## 2025-02-13 DIAGNOSIS — M54.12 CERVICAL RADICULOPATHY: ICD-10-CM

## 2025-02-13 PROCEDURE — 97140 MANUAL THERAPY 1/> REGIONS: CPT

## 2025-02-13 PROCEDURE — 97112 NEUROMUSCULAR REEDUCATION: CPT

## 2025-02-13 NOTE — PROGRESS NOTES
"Daily Note     Today's date: 2025  Patient name: Miguel Rodgers  : 1960  MRN: [id number]  Referring provider: Suresh Laird DO  Dx:   Encounter Diagnosis     ICD-10-CM    1. Cervicalgia  M54.2       2. Cervical radiculopathy  M54.12                      Subjective: left shoulder is doing better overall but right arm is the same.  No sleep last night      Objective: See treatment diary below      Assessment: Tolerated treatment well. Patient would benefit from continued PT.  Much less tightness in thoracic paraspinals noted      Plan: Continue per plan of care.              Date  1/22/25   1/27/25  1/29/25  2/3/25    2/11/25  2/13/25   Visits # EVAL  2 3  4  10 11    Manual                manual traction  5   5' 5   5    5    Ktape postural    5'  5   -        STM to rhomboids Ed.  self mobs  5'  IASTM --  sclene MFR  PROM to cervical spine levator scap and UTS    MFR   To scalenes IASTM Scalenes   scalene    C-Spine  stretching to scalene    stretching to scalenes  Gentle cervical mobilization with traction  Ulnar /median and radial nerve glide LUE STM to  rhomoboids and medial scap border right STM to  rhomoboids and medial scap border right    Neuro                scap squeeze   x10  X10  x10  10x supine  Supine x 10  Supine x 10     c/s extension /t's ext in chair    x10  Blue 1/2 foam --   Chin tucks with extension self OP  chin tuck  with overpressure  Chin tucks x10    Thoracic rotation  X10 seated x10 10x 10\" R/L X10  X10x10\"    chin tucks  x10  x10 x10  Lx10 supine  X10 seated and supine x10     wand extension    x10 x10   x10  Thoracic extension on table (seated)       ball rollouts   x10  x10  x10\"x 3\" x10 x10  x10    Scalene stretching 10\" x 3 way supine        PIN and stretch  UT and scalene with str. Cane x 10\" x  3 3 way scalene stretch  With SOS 3 way scalene stretch with SOS 3 way scalene stretch Scalene stretch Scalene stretch   Band rows/ext  x20 x20 X20- ytb  X20 ytb X20 ytb  " "  Doorway pec stretch  10\" x6 10\" x6 10\" x 6 10'x 6    Seated waiters tip          ytb x 20 Ytb x 20     rows/ extension         Ytb x 20   ytb x 20                                                                    TA                                                                               Gait                                                               Modalities                                              HEP      education on HEP-ed on posture  On pillows in bed  Heat vs cold   education on positioning of right UE  Education on cold vs heat and pain management with posture        Access Code: [email or web address]  URL: [email or web address]  Date: 02/03/2025  Prepared by: Amado Quiroz    Exercises  - Standing Median Nerve Glide  - 1 x daily - 7 x weekly - 1 sets - 10 reps  - Ulnar Nerve Mobilization - Low Level  - 1 x daily - 7 x weekly - 1 sets - 10 reps  - Radial Nerve Flossing  - 1 x daily - 7 x weekly - 1 sets - 10 reps                               "

## 2025-02-17 ENCOUNTER — OFFICE VISIT (OUTPATIENT)
Dept: PHYSICAL THERAPY | Facility: CLINIC | Age: 65
End: 2025-02-17
Payer: MEDICARE

## 2025-02-17 DIAGNOSIS — M54.12 CERVICAL RADICULOPATHY: ICD-10-CM

## 2025-02-17 DIAGNOSIS — M54.2 CERVICALGIA: Primary | ICD-10-CM

## 2025-02-17 PROCEDURE — 97112 NEUROMUSCULAR REEDUCATION: CPT

## 2025-02-17 PROCEDURE — 97140 MANUAL THERAPY 1/> REGIONS: CPT

## 2025-02-17 NOTE — PROGRESS NOTES
"Daily Note     Today's date: 2025  Patient name: Miguel Rodgers  : 1960  MRN: [id number]  Referring provider: Suresh Laird DO  Dx:   Encounter Diagnosis     ICD-10-CM    1. Cervicalgia  M54.2       2. Cervical radiculopathy  M54.12                      Subjective: going for epidural on Tuesday at 1pm      Objective: See treatment diary below      Assessment: Tolerated treatment well. Patient would benefit from continued PT      Plan: Continue per plan of care.              Date 2/17/25   1/27/25  1/29/25  2/3/25    2/11/25  2/13/25   Visits # EVAL  2 3  4  10 11    Manual                manual traction  5   5' 5   5    5    Ktape postural    5'  5   -        STM to rhomboids Ed.  self mobs  5'  IASTM --  sclene MFR  PROM to cervical spine levator scap and UTS    MFR   To scalenes IASTM Scalenes   scalene    C-Spine  stretching to scalene    stretching to scalenes  Gentle cervical mobilization with traction  Ulnar /median and radial nerve glide LUE STM to  rhomoboids and medial scap border right STM to  rhomoboids and medial scap border right    Neuro                scap squeeze   x10  X10  x10  10x supine  Supine x 10  Supine x 10     c/s extension /t's ext in chair    x10  Blue 1/2 foam --   Chin tucks with extension self OP  chin tuck  with overpressure  Chin tucks x10    Thoracic rotation  X10 seated x10 10x 10\" R/L X10  X10x10\"    chin tucks  x10  x10 x10  Lx10 supine  X10 seated and supine x10     wand extension    x10 x10   x10  Thoracic extension on table (seated)       ball rollouts   x10  x10  x10\"x 3\" x10 x10  x10    Scalene stretching 10\" x 3 way supine        PIN and stretch  UT and scalene with str. Cane x 10\" x  3 3 way scalene stretch  With SOS 3 way scalene stretch with SOS 3 way scalene stretch Scalene stretch Scalene stretch   Band rows/ext Ytb x 20  x20 x20 X20- ytb  X20 ytb X20 ytb    Doorway pec stretch 1-\" x 6 10\" x6 10\" x6 10\" x 6 10'x 6    Seated waiters tip  ytb x 20     "    ytb x 20 Ytb x 20     rows/ extension  ytb x 20 ea       Ytb x 20   ytb x 20                                                                    TA                                                                               Gait                                                               Modalities                                              HEP      education on HEP-ed on posture  On pillows in bed  Heat vs cold   education on positioning of right UE  Education on cold vs heat and pain management with posture        Access Code: [email or web address]  URL: [email or web address]  Date: 02/03/2025  Prepared by: Amado Quiroz    Exercises  - Standing Median Nerve Glide  - 1 x daily - 7 x weekly - 1 sets - 10 reps  - Ulnar Nerve Mobilization - Low Level  - 1 x daily - 7 x weekly - 1 sets - 10 reps  - Radial Nerve Flossing  - 1 x daily - 7 x weekly - 1 sets - 10 reps

## 2025-02-18 ENCOUNTER — HOSPITAL ENCOUNTER (OUTPATIENT)
Facility: AMBULARY SURGERY CENTER | Age: 65
Setting detail: OUTPATIENT SURGERY
Discharge: HOME | End: 2025-02-18
Attending: STUDENT IN AN ORGANIZED HEALTH CARE EDUCATION/TRAINING PROGRAM | Admitting: STUDENT IN AN ORGANIZED HEALTH CARE EDUCATION/TRAINING PROGRAM
Payer: MEDICARE

## 2025-02-18 ENCOUNTER — APPOINTMENT (OUTPATIENT)
Dept: RADIOLOGY | Facility: HOSPITAL | Age: 65
End: 2025-02-18
Payer: MEDICARE

## 2025-02-18 VITALS
RESPIRATION RATE: 16 BRPM | DIASTOLIC BLOOD PRESSURE: 79 MMHG | OXYGEN SATURATION: 99 % | HEART RATE: 88 BPM | SYSTOLIC BLOOD PRESSURE: 142 MMHG | TEMPERATURE: 98.1 F

## 2025-02-18 PROCEDURE — 62321 NJX INTERLAMINAR CRV/THRC: CPT | Performed by: STUDENT IN AN ORGANIZED HEALTH CARE EDUCATION/TRAINING PROGRAM

## 2025-02-18 RX ORDER — LIDOCAINE HYDROCHLORIDE 10 MG/ML
INJECTION, SOLUTION EPIDURAL; INFILTRATION; INTRACAUDAL; PERINEURAL AS NEEDED
Status: DISCONTINUED | OUTPATIENT
Start: 2025-02-18 | End: 2025-02-18 | Stop reason: HOSPADM

## 2025-02-18 RX ORDER — SODIUM CHLORIDE 9 MG/ML
INJECTION INTRAVENOUS AS NEEDED
Status: DISCONTINUED | OUTPATIENT
Start: 2025-02-18 | End: 2025-02-18 | Stop reason: HOSPADM

## 2025-02-18 RX ORDER — METHYLPREDNISOLONE ACETATE 80 MG/ML
INJECTION, SUSPENSION INTRA-ARTICULAR; INTRALESIONAL; INTRAMUSCULAR; SOFT TISSUE AS NEEDED
Status: DISCONTINUED | OUTPATIENT
Start: 2025-02-18 | End: 2025-02-18 | Stop reason: HOSPADM

## 2025-02-18 NOTE — DISCHARGE INSTRUCTIONS
Epidural Steroid Injection   WHAT YOU NEED TO KNOW:   An epidural steroid injection (COLBY) is a procedure to inject steroid medicine into the epidural space. The epidural space is between your spinal cord and vertebrae. Steroids reduce inflammation and fluid buildup in your spine that may be causing pain. You may be given pain medicine along with the steroids.          ACTIVITY  Do not drive or operate machinery today.  No strenuous activity today - bending, lifting, etc.  You may resume normal activites starting tomorrow - start slowly and as tolerated.  You may shower today, but no tub baths or hot tubs.  You may have numbness for several hours from the local anesthetic. Please use caution and common sense, especially with weight-bearing activities.    CARE OF THE INJECTION SITE  If you have soreness or pain, apply ice to the area today (20 minutes on/20 minutes off).  Starting tomorrow, you may use warm, moist heat or ice if needed.  You may have an increase or change in your discomfort for 36-48 hours after your treatment.  Apply ice and continue with any pain medication you have been prescribed.  Notify the [facility] if you have any of the following: redness, drainage, swelling, headache, stiff neck or fever above 100°F.    SPECIAL INSTRUCTIONS  Our office will contact you in approximately 14 days for a progress report.    MEDICATIONS  Continue to take all routine medications.  Our office may have instructed you to hold some medications.    As no general anesthesia was used in today's procedure, you should not experience any side effects related to anesthesia.     If you are diabetic, the steroids used in today's injection may temporarily increase your blood sugar levels after the first few days after your injection. Please keep a close eye on your sugars and alert the doctor who manages your diabetes if your sugars are significantly high from your baseline or you are symptomatic.     If you have a  problem specifically related to your procedure, please call our office at [phone].  Problems not related to your procedure should be directed to your primary care physician.

## 2025-02-18 NOTE — H&P
History of Present Illness: The patient is a 64 y.o. male who presents with complaints of neck pain.     Past Medical History:   Diagnosis Date    Chest discomfort     Lung cancer (HCC)        Past Surgical History:   Procedure Laterality Date    EPIDURAL BLOCK INJECTION N/A 12/20/2024    Procedure: L4-L5 LUMBAR EPIDURAL STEROID INJECTION;  Surgeon: Chris Sharpe MD;  Location: [facility] MAIN OR;  Service: Pain Management     EPIDURAL BLOCK INJECTION N/A 1/17/2025    Procedure: C7-T1 CERVICAL EPIDURAL STEROID INJECTION;  Surgeon: Chris Sharpe MD;  Location: [facility] MAIN OR;  Service: Pain Management     LUNG LOBECTOMY      upper    TONSILLECTOMY         No current facility-administered medications for this encounter.    No Known Allergies    Physical Exam:   Vitals:    02/18/25 1254   BP: 131/78   Pulse: 86   Resp: 18   Temp: 98.1 °F (36.7 °C)   SpO2: 99%     General: Awake, Alert, Oriented x 3, Mood and affect appropriate  Respiratory: Respirations even and unlabored  Cardiovascular: Peripheral pulses intact; no edema  Musculoskeletal Exam: neck pain.     ASA Score: 3    Patient/Chart Verification  Patient ID Verified: Verbal, Armband  H&P( within 30 days) Verified: Yes  Interval H&P(within 24 hr) Complete (required for Outpatients and Surgery Admit only): Yes  Allergies Reviewed: Yes    Assessment: Cervical radiculopathy    Plan: C6-C7 IRVIN

## 2025-02-18 NOTE — OP NOTE
Pre-procedure Diagnosis: Cervical Radiculopathy  Post-procedure Diagnosis: Cervical radiculopathy  Procedure Title(s):  1. C6-C7 interlaminar epidural steroid injection      2. Intraoperative fluoroscopy  Attending Surgeon:   Chris Sharpe MD  Anesthesia:   Local     Indications: The patient is a 64 y.o. year-old male with a diagnosis of Cervical radiculopathy. The patient's history and physical exam were reviewed. The risks, benefits and alternatives to the procedure were discussed, and all questions were answered to the patient's satisfaction. The patient agreed to proceed, and written informed consent was obtained.    Procedure in Detail: The patient was brought into the procedure room and placed in the prone position on the fluoroscopy table. The area of the cervical spine was prepped with chlorhexidine gluconate solution times one and draped in a sterile manner.    The C6-C7 interspace was identified and marked under AP fluoroscopy. The skin and subcutaneous tissues in the area were anesthetized with 1% lidocaine. A 20-gauge Tuohy epidural needle was directed toward the interspace under fluoroscopic guidance until the ligamentum flavum was engaged. The C-arm was oblique to the right to obtain a contra-lateral oblique view.  From this point, a loss of resistance technique with air was used to identify entrance of the needle into the epidural space. Once an appropriate loss was obtained, negative aspiration was confirmed, and 1 ml Omnipaque 300 contrast solution was injected. An appropriate epidurogram was noted.    Then, after negative aspiration, a solution consisting of 1-mL depomedrol(80mg/ml) and 2-mL preservative-free saline was easily injected. The needle was removed with a 1% lidocaine flush. The patient's back was cleaned and a bandage was placed over the site of needle insertion.    Disposition: The patient tolerated the procedure well, and there were no apparent complications. The patient was taken  to the recovery area where written discharge instructions for the procedure were given.     Estimated Blood Loss: None  Specimens Obtained: N/A

## 2025-02-21 ENCOUNTER — TELEPHONE (OUTPATIENT)
Age: 65
End: 2025-02-21

## 2025-02-21 DIAGNOSIS — M54.12 CERVICAL RADICULOPATHY: Primary | ICD-10-CM

## 2025-02-21 DIAGNOSIS — M54.12 CERVICAL RADICULOPATHY: ICD-10-CM

## 2025-02-21 RX ORDER — PREGABALIN 75 MG/1
75 CAPSULE ORAL 3 TIMES DAILY
Qty: 90 CAPSULE | Refills: 0 | Status: SHIPPED | OUTPATIENT
Start: 2025-02-21 | End: 2025-03-23

## 2025-02-21 NOTE — TELEPHONE ENCOUNTER
Caller: maribel Rivera    Doctor: Dr. Sharpe    Reason for call: pt spoke with Dr. Sharpe on his 2/18 procedure appt about the gabapentin.  Pt stated the dr was going to check into other medications and pt has not heard anything.  Pt stated he isn't getting much sleep because of pain in his arm and his back.    Pt also mentioned that tizanidine is running low and may need a need script.  Pt stated he isn't sure honestly if it's even helping.  Would like the dr's input     Call back#: [phone]

## 2025-02-21 NOTE — TELEPHONE ENCOUNTER
RN s/w pt regarding previous and pt would like to try the Lyrica.  Please send to his [facility] on file.  How would you like him to transition from the gabapentin to the lyrica?  Pt is currently taking 600 mg TID of gabapentin so side effects just not helping.    Pt would also like the refill of the tizanidine and was only taking it BID will now take it TID as ordered if needed.  Also was not aware that he can take NSAIDS and tylenol while taking the tizanidine and the gabapentin or Lyrica.    Pt appreciative of  same and will wait to hear instructions for transition to Lyrica from gabapentin.

## 2025-02-22 DIAGNOSIS — F51.01 PRIMARY INSOMNIA: ICD-10-CM

## 2025-02-24 ENCOUNTER — EVALUATION (OUTPATIENT)
Dept: PHYSICAL THERAPY | Facility: CLINIC | Age: 65
End: 2025-02-24
Payer: MEDICARE

## 2025-02-24 DIAGNOSIS — M54.2 CERVICALGIA: Primary | ICD-10-CM

## 2025-02-24 PROCEDURE — 97140 MANUAL THERAPY 1/> REGIONS: CPT

## 2025-02-24 PROCEDURE — 97112 NEUROMUSCULAR REEDUCATION: CPT

## 2025-02-24 RX ORDER — DOXEPIN HYDROCHLORIDE 10 MG/1
10 CAPSULE ORAL
Qty: 90 CAPSULE | Refills: 1 | Status: SHIPPED | OUTPATIENT
Start: 2025-02-24 | End: 2025-03-26

## 2025-02-24 NOTE — PROGRESS NOTES
PT Evaluation     Today's date: 2025  Patient name: Miguel Rodgers  : 1960  MRN: [id number]  Referring provider: Suresh Laird DO  Dx:   Encounter Diagnosis     ICD-10-CM    1. Cervicalgia  M54.2           Start Time: 930  Stop Time: 101  Total time in clinic (min): 45 minutes  Assessment:  No significant reduction of pain since start of PT or after 3 epidurals. Pt is not showing too much improvement in pain and therefore recommending continuation with medical team to determine course of treatment.  Dishcharge from PT is recommended due to lack of progress in pain.       Short term goals  2 weeks  - independent with joint protection strategies to reduce pain by 50%  - Patient able to perform and maintain cervical neutral positioning during seated positioning without verbal or manual cueing  - Patient to exhibit independence with home exercise program    Long term goals 4 weeks -  - improve cervical ROM to painfree in all directions -met  - abolish trigger points in upper trapezius-met  - abolish muscle spasms -met  - Improve gross upper extremity strength to 5/5 to allow to return to heavy household activity and self care -met  - Patient to reduce pain to 1/10 at its worst to improve QOL and return to function - NOT MET  - independent with home exercise program for long term maintenance program -met   - Abolish UE symptoms -not met  _ FOTO to pred score or greater-  score of 60 pred. 67- mostly met     Impairments:      pain with function   activity intolerance   weight bearing intolerance      Prognosis:  good - but needs for follow up with MD for alternative interventions besides PT  Positive and negative prognostic indicator(s):  positive attitude about recovery     Planned interventions:  D/c TO HEP  Duration in visits:  12  Frequency: 2 visits per week  Duration in weeks:  6  Plan of Care beginning date: 25  Plan of Care expiration date: 6 weeks - 2025  Treatment plan discussed  with: Patient        History of Current Injury: Currently not feeling any relief with injection x3 .  I will make an appointment for a neurologist soon. I get most relief from lying down flat and PT helps a little but I have a lot of pain still.         onset of shoulder blade right side and lower cervical region insidious onset.  Reports having extreme pain into right UE from armpit into right hand and fingers at times that waxes and wanes.  Tingling and numbness into right hand which comes and goes 3-4/10  and varies for unknown reasons.  Went to MD and  she  Will have an MRI in 1 week and given gabapentin 3x per day and no relief noted.  Relates pain is constant during the day and eases with lying down.    Problems:  sitting   Pain location: pain in cervical region 4-8/10  Pain descriptors:  sharp pain  Pain intensity:  average pain 5/10 in cervical and right UE        Aggravating factors: sitting down  Easing factors: lying down, sitting up straight      Imaging: MRI pending  Hobbies/Interests: yard work   Occupation: retired     OBJECTIVE:     POSTURE:   Standing: forward head  Sitting: forward head and rounded shoulders      Cervical AROM limitations:  (* =  Pain)                  Cervical ROM Evaluation Re-evaluation Re-evaluation Re-evaluation Re-evaluation   Date 1/2/2025 2/24/25         Flexion           wnl  wnl          Extension            wnl  WNL         Protraction            wnl  WNL         Retraction             wnl  wnl           L R L R L R L R L R   Rotation       Min loss     Min loss  wno  wnl               Sidebending       Min/mod loss     Min/mod loss  wnl  wnl                  Cervical   Repeated flexion 1x10:                        Increased symptoms in the thoracic spine  Repeated retraction 1x10:      Decreased pain - abolished pain      Shoulder AROM sitting/ PROM supine:   (*= pain)     Shoulder AROM Evaluation Evaluation Re-evaluation Re-evaluation Re-evaluation   Date 1/2/2025  Right 1/2/2025 Left 2/24/25        Shoulder flexion Wnl             wnl Wnl monty        Shoulder abduction wnl wnl Wnl monty        Shoulder External rotation wnl   wnl  wnl monty       Shoulder Internal rotation wnl   wnl Wnl monty        Shoulder Extension       wnl Monty                        MMT / Strength testing:          MMT (manual muscle testing) EVAL  Eval RE-eval Re-eval Re-eval   Date 1/2/2025 1/2/2025 2/24/25             LEFT RIGHT Involved side       Shoulder Flexion       5/5  5/5  5        Shoulder Abduction       5/5  5/5  5       Shoulder Internal rotation        5/5  5/5  5       Shoulder External rotation 5/5  5/5  5       Elbow Flexion       55/5  5/5  5       Elbow Extension       5/5   5/5  5       Mid trapezius       5/5  5/5  5       Lower trapezius       5/5  5/5   5       Rhomboids       5/5 5/5  5       Gross           lbs  lbs   normal monty very strong                          Myotomes UE Right  Left   C4 shoulder elevation         5/5           5/5   C5 shoulder abduction         5/5           5/5   C6 Elbow flexion         5/5           5/5   C6 Wrist Extension         5/5           5/5   C7 Elbow Extension         5/5           5/5   C7 Wrist Flexion         5/5           5/5   C8 Finger flexion          5/5           5/5   T1 Finger Abduction         5/5           5/5            Palpation:  1/2/2025 mild tightness in   2/24/25: negative     Sensation to light touch:   1/2/2025 tenderness to light touch at forearm volar aspect with increased sensitivity  2/24/25- same as on evaluation - moderate tenderness to volar aspect of right UE and under axilla        Special tests:   Spurlings/Quadrant Test neg  ULTT neg ulnar, median and radial -neg bilaterally                Date 2/17/25   2/24/25  1/29/25  2/3/25    2/11/25  2/13/25   Visits # EVAL  12 3  4  10 11    Manual                manual traction  5   5' 5   5    5    Ktape postural    5'  5   -        STM to rhomboids Ed.  self mobs   "5'  IASTM --  sclene MFR  PROM to cervical spine levator scap and UTS    MFR   To scalenes IASTM Scalenes   scalene    C-Spine  stretching to scalene  stretching to levator scap and uts/  stretching to scalenes  Gentle cervical mobilization with traction  Ulnar /median and radial nerve glide LUE STM to  rhomoboids and medial scap border right STM to  rhomoboids and medial scap border right    Neuro                scap squeeze   x10  X10  x10  10x supine  Supine x 10  Supine x 10     c/s extension /t's ext in chair    x10  Blue 1/2 foam --   Chin tucks with extension self OP  chin tuck  with overpressure  Chin tucks x10    Thoracic rotation  X10 seated x10 10x 10\" R/L X10  X10x10\"    chin tucks  x10  x10 x10  Lx10 supine  X10 seated and supine x10     wand extension    x10 x10   x10  Thoracic extension on table (seated)       ball rollouts   x10  x10  x10\"x 3\" x10 x10  x10    Scalene stretching 10\" x 3 way supine 10\" x 3 b       PIN and stretch  UT and scalene with str. Cane x 10\" x  3 3 way scalene stretch  With SOS 3 way scalene stretch with SOS 3 way scalene stretch Scalene stretch Scalene stretch   Band rows/ext Ytb x 20  x20 x20 X20- ytb  X20 ytb X20 ytb    Doorway pec stretch 1-\" x 6 10\" x6 10\" x6 10\" x 6 10'x 6    Seated waiters tip  ytb x 20  ytb x 20       ytb x 20 Ytb x 20     rows/ extension  ytb x 20 ea  ytb x 20     Ytb x 20   ytb x 20                                                                    TA                                                                               Gait                                                               Modalities                                              HEP      education on HEP-ed on posture  On pillows in bed  Heat vs cold   education on positioning of right UE  Education on cold vs heat and pain management with posture        Access Code: [email or web address]  URL: [email or web address]  Date: 02/03/2025  Prepared by: Amado Quiroz    Exercises  - " Standing Median Nerve Glide  - 1 x daily - 7 x weekly - 1 sets - 10 reps  - Ulnar Nerve Mobilization - Low Level  - 1 x daily - 7 x weekly - 1 sets - 10 reps  - Radial Nerve Flossing  - 1 x daily - 7 x weekly - 1 sets - 10 reps

## 2025-02-25 DIAGNOSIS — M54.12 CERVICAL RADICULOPATHY: Primary | ICD-10-CM

## 2025-02-25 RX ORDER — CYCLOBENZAPRINE HCL 10 MG
10 TABLET ORAL 3 TIMES DAILY PRN
Qty: 42 TABLET | Refills: 0 | Status: SHIPPED | OUTPATIENT
Start: 2025-02-25 | End: 2025-03-11

## 2025-02-25 NOTE — TELEPHONE ENCOUNTER
Caller: Miguel     Doctor: Dr. JI    Reason for call: Pt advised that the procedure and medications are not working to help him with his pain. Pt advised that he feels worse     Call back#: [phone]

## 2025-02-25 NOTE — TELEPHONE ENCOUNTER
S/W pt and wife on speaker phone.  Pt s/p on 2/18/25 C6-7 IRVIN on 2/28/25.  Advised it takes up to 3-5 days for the steroids to kick in and up to 2 weeks to see the full effect.  Advised to give the injection more time to work.  They stated the previous injections never really worked before and this is the second one in the cervical. Pain medications are not working.  He is in pain day and night.  He made an appt to see the surgeon for 3/5.  The pain wakes him up.  Pt is taking Lyrica 75 mg TID and it is not helping.  Advised may need to give the medication more time to build up in his body.  He is getting less sleep now with the Lyrica.  The tizanidine is not helping.  The pain medications don't even lessen the pain.  Pain varies from a 3-7/10.  He has pain in upper back that radiates to right arm which is intense pain, pins and needles and lighting bolt sharp pain down his right arm.  Left arm is dull ache and elbow is sensitive.  Pt stated he is allowed to take tylenol.  Advised he can take up to 3,000 mg in 24 hours or 1,000 mg TID.  He is afraid to take too much medications so it don't affect his organs and stay strong for surgery.  What can be done for his pain?  They verbalized understanding.  Please advise.

## 2025-02-28 ENCOUNTER — OFFICE VISIT (OUTPATIENT)
Dept: FAMILY MEDICINE CLINIC | Facility: CLINIC | Age: 65
End: 2025-02-28
Payer: MEDICARE

## 2025-02-28 ENCOUNTER — APPOINTMENT (OUTPATIENT)
Dept: LAB | Facility: CLINIC | Age: 65
End: 2025-02-28
Payer: MEDICARE

## 2025-02-28 VITALS
HEART RATE: 105 BPM | SYSTOLIC BLOOD PRESSURE: 120 MMHG | BODY MASS INDEX: 29.73 KG/M2 | TEMPERATURE: 99 F | RESPIRATION RATE: 16 BRPM | HEIGHT: 66 IN | WEIGHT: 185 LBS | OXYGEN SATURATION: 96 % | DIASTOLIC BLOOD PRESSURE: 80 MMHG

## 2025-02-28 DIAGNOSIS — C34.12 MALIGNANT NEOPLASM OF UPPER LOBE, LEFT BRONCHUS OR LUNG (HCC): ICD-10-CM

## 2025-02-28 DIAGNOSIS — E04.2 MULTINODULAR GOITER: Primary | ICD-10-CM

## 2025-02-28 DIAGNOSIS — R49.0 VOICE HOARSENESS: ICD-10-CM

## 2025-02-28 DIAGNOSIS — I20.81 ANGINA PECTORIS WITH CORONARY MICROVASCULAR DYSFUNCTION (HCC): ICD-10-CM

## 2025-02-28 DIAGNOSIS — D70.1 AGRANULOCYTOSIS SECONDARY TO CANCER CHEMOTHERAPY (HCC): ICD-10-CM

## 2025-02-28 DIAGNOSIS — E03.8 OTHER SPECIFIED HYPOTHYROIDISM: Primary | ICD-10-CM

## 2025-02-28 DIAGNOSIS — M54.16 LUMBAR RADICULOPATHY: ICD-10-CM

## 2025-02-28 DIAGNOSIS — J44.9 CHRONIC OBSTRUCTIVE PULMONARY DISEASE, UNSPECIFIED COPD TYPE (HCC): ICD-10-CM

## 2025-02-28 DIAGNOSIS — M54.12 CERVICAL RADICULOPATHY: ICD-10-CM

## 2025-02-28 DIAGNOSIS — T45.1X5A AGRANULOCYTOSIS SECONDARY TO CANCER CHEMOTHERAPY (HCC): ICD-10-CM

## 2025-02-28 LAB
T4 FREE SERPL-MCNC: 0.87 NG/DL (ref 0.61–1.12)
TSH SERPL DL<=0.05 MIU/L-ACNC: 0.13 UIU/ML (ref 0.45–4.5)

## 2025-02-28 PROCEDURE — 84443 ASSAY THYROID STIM HORMONE: CPT

## 2025-02-28 PROCEDURE — 84439 ASSAY OF FREE THYROXINE: CPT

## 2025-02-28 PROCEDURE — 99214 OFFICE O/P EST MOD 30 MIN: CPT | Performed by: STUDENT IN AN ORGANIZED HEALTH CARE EDUCATION/TRAINING PROGRAM

## 2025-02-28 PROCEDURE — G2211 COMPLEX E/M VISIT ADD ON: HCPCS | Performed by: STUDENT IN AN ORGANIZED HEALTH CARE EDUCATION/TRAINING PROGRAM

## 2025-02-28 PROCEDURE — 84445 ASSAY OF TSI GLOBULIN: CPT

## 2025-02-28 NOTE — ASSESSMENT & PLAN NOTE
Follows with pain management, appreciate recommendations, patient will be seeing neurosurgery for further evaluation next week.

## 2025-02-28 NOTE — ASSESSMENT & PLAN NOTE
Recommend evaluation with ENT for possible surgical procedure, per patient outside facility workup, unable to obtain biopsy based on abnormal lymph node location.  Recheck endocrinology thyroid blood work today in office.  ENT evaluation appreciated.  Orders:  •  Ambulatory Referral to Otolaryngology; Future

## 2025-02-28 NOTE — ASSESSMENT & PLAN NOTE
Continue to follow-up with oncology, concern for lymph node right neck region which requires biopsy, patient has been told by oncology team IR cannot biopsy due to location, will send to ENT for further evaluation.

## 2025-02-28 NOTE — PROGRESS NOTES
Name: Miguel Rodgers      : 1960      MRN: [id number]  Encounter Provider: Orlando Eduardo DO  Encounter Date: 2025   Encounter department: [facility]  :  Assessment & Plan  Multinodular goiter  Recommend evaluation with ENT for possible surgical procedure, per patient outside facility workup, unable to obtain biopsy based on abnormal lymph node location.  Recheck endocrinology thyroid blood work today in office.  ENT evaluation appreciated.  Orders:  •  Ambulatory Referral to Otolaryngology; Future    Angina pectoris with coronary microvascular dysfunction (HCC)  HCC documented, no chest pain or shortness of breath       Lumbar radiculopathy  Follows with pain management, appreciate recommendations       Cervical radiculopathy  Follows with pain management, appreciate recommendations, patient will be seeing neurosurgery for further evaluation next week.       Agranulocytosis secondary to cancer chemotherapy (HCC)  HCC documentation, most recent CBC appropriate       Malignant neoplasm of upper lobe, left bronchus or lung (HCC)  Continue to follow-up with oncology, concern for lymph node right neck region which requires biopsy, patient has been told by oncology team IR cannot biopsy due to location, will send to ENT for further evaluation.       Chronic obstructive pulmonary disease, unspecified COPD type (HCC)  Lungs clear to auscultation bilaterally on exam today       Voice hoarseness  More urgent ENT evaluation given voice hoarseness and concern for tracheal deviation  Orders:  •  Ambulatory Referral to Otolaryngology; Future           History of Present Illness   Follow up chronic disease management.       Review of Systems   Constitutional:  Negative for chills and fever.   HENT:  Positive for voice change. Negative for ear pain and sore throat.    Eyes:  Negative for pain and visual disturbance.   Respiratory:  Negative for cough, shortness of breath and wheezing.   "  Cardiovascular:  Negative for chest pain and palpitations.   Gastrointestinal:  Negative for abdominal pain and vomiting.   Genitourinary:  Negative for dysuria and hematuria.   Musculoskeletal:  Negative for arthralgias and back pain.   Skin:  Negative for color change and rash.   Neurological:  Negative for seizures and syncope.   Psychiatric/Behavioral:  Negative for agitation, behavioral problems and confusion.    All other systems reviewed and are negative.      Objective   /80 (BP Location: Left arm, Patient Position: Sitting, Cuff Size: Standard)   Pulse 105   Temp 99 °F (37.2 °C) (Temporal)   Resp 16   Ht 5' 6\" (1.676 m)   Wt 83.9 kg (185 lb)   SpO2 96%   BMI 29.86 kg/m²      Physical Exam  Vitals and nursing note reviewed.   Constitutional:       General: He is not in acute distress.     Appearance: He is well-developed.   HENT:      Head: Normocephalic and atraumatic.   Eyes:      General: No scleral icterus.     Conjunctiva/sclera: Conjunctivae normal.   Cardiovascular:      Rate and Rhythm: Normal rate and regular rhythm.      Pulses: Normal pulses.      Heart sounds: No murmur heard.  Pulmonary:      Effort: Pulmonary effort is normal. No respiratory distress.      Breath sounds: Normal breath sounds.   Abdominal:      General: Bowel sounds are normal. There is no distension.      Palpations: Abdomen is soft.      Tenderness: There is no abdominal tenderness.   Musculoskeletal:         General: No swelling. Normal range of motion.      Cervical back: Neck supple.   Skin:     General: Skin is warm and dry.      Capillary Refill: Capillary refill takes less than 2 seconds.      Coloration: Skin is not jaundiced.   Neurological:      General: No focal deficit present.      Mental Status: He is alert and oriented to person, place, and time. Mental status is at baseline.   Psychiatric:         Mood and Affect: Mood normal.         Behavior: Behavior normal.         "

## 2025-03-02 LAB — TSI SER-ACNC: <0.1 IU/L (ref 0–0.55)

## 2025-03-03 ENCOUNTER — RESULTS FOLLOW-UP (OUTPATIENT)
Dept: ENDOCRINOLOGY | Facility: CLINIC | Age: 65
End: 2025-03-03

## 2025-03-11 ENCOUNTER — OFFICE VISIT (OUTPATIENT)
Age: 65
End: 2025-03-11
Payer: MEDICARE

## 2025-03-11 DIAGNOSIS — G89.4 CHRONIC PAIN SYNDROME: ICD-10-CM

## 2025-03-11 DIAGNOSIS — M54.12 CERVICAL RADICULOPATHY: Primary | ICD-10-CM

## 2025-03-11 DIAGNOSIS — M54.16 LUMBAR RADICULOPATHY: ICD-10-CM

## 2025-03-11 PROCEDURE — 99214 OFFICE O/P EST MOD 30 MIN: CPT | Performed by: STUDENT IN AN ORGANIZED HEALTH CARE EDUCATION/TRAINING PROGRAM

## 2025-03-11 RX ORDER — ACETAMINOPHEN AND CODEINE PHOSPHATE 300; 30 MG/1; MG/1
1 TABLET ORAL EVERY 8 HOURS PRN
COMMUNITY
Start: 2025-03-05

## 2025-03-11 NOTE — PROGRESS NOTES
Pain Medicine Follow-Up Note    Assessment:  1. Cervical radiculopathy    2. Lumbar radiculopathy    3. Chronic pain syndrome      Patient is a pleasant six 64 year-old male who presents as a follow-up visit after last being seen on 2/18/2025 where he underwent a C6/C7 cervical epidural steroid junction.  This is after patient underwent a C7-T1 cervical epidural steroid injection previously.  Unfortunately patient Is only reporting a little bit of relief after injection therapy rating his pain is a 4-7 out of 10 on numeric rating scale.  He states the injection was helpful for 2 days, improving symptoms by about 90% but the pain is now returned.  He states pain is constant and the quality pain is sharp, shooting, numbing, pins needles like sensation.  The pain does interfere with his daily activities and he has been doing home exercises and previously did physical therapy.  Patient dates the left-sided symptoms have improved but he still having sharp pain around his right elbow.  Patient was recently seen by neurosurgery in [facility] and was counseled to follow-up with ENT along with getting an EMG to further evaluate his bilateral upper extremity symptoms.  Patient was also started on a course of dexamethasone along with Tylenol 3.    Extensive discussion with patient and family in regards to treatment options.  Discussed with patient that I agree I think it is reasonable for him to follow-up with ENT in regards to his hoarseness in the setting of his prior cancer.  In regards to his bilateral upper extremity symptoms I agree that I think is reasonable to get an EMG of the bilateral upper extremities.  In regards to symptomatic relief patient to continue with Lyrica 75 mg 3 times daily along with steroid taper and Tylenol 3 as prescribed by his surgeon.  Patient counseled to follow-up with the office after he has done his EMG and follow-up with his neurosurgeon in regards to next steps.    Plan:  Continue  Lyrica 75 mg TID   Continue Tylenol 3# as prescribed by [facility]  Continue dexamethasone taper as prescribed by [facility]  Continue HEP   Follow up after EMG and follow up with [facility]   No orders of the defined types were placed in this encounter.      New Medications Ordered This Visit   Medications   • acetaminophen-codeine (TYLENOL with CODEINE #3) 300-30 MG per tablet     Sig: Take 1 tablet by mouth every 8 (eight) hours as needed       My impressions and treatment recommendations were discussed in detail with the patient who verbalized understanding and had no further questions.        Follow-up is planned in six weeks time or sooner as warranted.  Discharge instructions were provided. I personally saw and examined the patient and I agree with the above discussed plan of care.    History of Present Illness:    Miguel Rodgers is a 64 y.o. male who presents to [facility] for interval re-evaluation of the above stated pain complaints. The patient has a past medical and chronic pain history as outlined in the assessment section. He was last seen on 2/18/2025.    Patient is a pleasant six 4-year-old male who presents as a follow-up visit after last being seen on 2/18/2025 where he underwent a C6/C7 cervical epidural steroid junction.  This is after patient underwent a C7-T1 cervical epidural steroid injection previously.  Unfortunately patient Is only reporting a little bit of relief after injection therapy rating his pain is a 4-7 out of 10 on numeric rating scale.  He states the injection was helpful for 2 days, improving symptoms by about 90% but the pain is now returned.  He states pain is constant and the quality pain is sharp, shooting, numbing, pins needles like sensation.  The pain does interfere with his daily activities and he has been doing home exercises and previously did physical therapy.  Patient dates the left-sided symptoms have improved but he still having sharp pain around his right  elbow.  Patient was recently seen by neurosurgery in [facility] and was counseled to follow-up with ENT along with getting an EMG to further evaluate his bilateral upper extremity symptoms.  Patient was also started on a course of dexamethasone along with Tylenol 3.    Other than as stated above, the patient denies any interval changes in medications, medical condition, mental condition, symptoms, or allergies since the last office visit.         Review of Systems:    Review of Systems   Constitutional:  Negative for unexpected weight change.   HENT:  Negative for ear pain.    Eyes:  Negative for visual disturbance.   Respiratory:  Positive for chest tightness and wheezing. Negative for shortness of breath.    Gastrointestinal:  Negative for abdominal pain.   Musculoskeletal:  Negative for back pain.        Elbo pain   Neurological:  Positive for headaches. Negative for weakness and numbness.   Psychiatric/Behavioral:  Positive for sleep disturbance. Negative for decreased concentration.          Past Medical History:   Diagnosis Date   • Chest discomfort    • Lung cancer (HCC)        Past Surgical History:   Procedure Laterality Date   • EPIDURAL BLOCK INJECTION N/A 12/20/2024    Procedure: L4-L5 LUMBAR EPIDURAL STEROID INJECTION;  Surgeon: Chris Sharpe MD;  Location: [facility] MAIN OR;  Service: Pain Management    • EPIDURAL BLOCK INJECTION N/A 1/17/2025    Procedure: C7-T1 CERVICAL EPIDURAL STEROID INJECTION;  Surgeon: Chris Sharpe MD;  Location: [facility] MAIN OR;  Service: Pain Management    • EPIDURAL BLOCK INJECTION N/A 2/18/2025    Procedure: C6-C7 CERVICAL EPIDURAL STEROID INJECTION;  Surgeon: Chris Sharpe MD;  Location: [facility] MAIN OR;  Service: Pain Management    • LUNG LOBECTOMY      upper   • TONSILLECTOMY         Family History   Problem Relation Age of Onset   • Lung cancer Mother    • Heart disease Father    • Diabetes Brother    • No Known Problems Son        Social History     Occupational  History   • Not on file   Tobacco Use   • Smoking status: Never   • Smokeless tobacco: Never   Vaping Use   • Vaping status: Never Used   Substance and Sexual Activity   • Alcohol use: Never   • Drug use: Never   • Sexual activity: Yes     Partners: Female         Current Outpatient Medications:   •  acetaminophen-codeine (TYLENOL with CODEINE #3) 300-30 MG per tablet, Take 1 tablet by mouth every 8 (eight) hours as needed, Disp: , Rfl:   •  cyclobenzaprine (FLEXERIL) 10 mg tablet, Take 1 tablet (10 mg total) by mouth 3 (three) times a day as needed for muscle spasms for up to 14 days, Disp: 42 tablet, Rfl: 0  •  doxepin (SINEquan) 10 mg capsule, TAKE 1 CAPSULE (10 MG TOTAL) BY MOUTH DAILY AT BEDTIME, Disp: 90 capsule, Rfl: 1  •  pregabalin (LYRICA) 75 mg capsule, Take 1 capsule (75 mg total) by mouth 3 (three) times a day, Disp: 90 capsule, Rfl: 0  •  gabapentin (Neurontin) 600 MG tablet, Take 1 tablet (600 mg total) by mouth 3 (three) times a day (Patient not taking: Reported on 2/28/2025), Disp: 90 tablet, Rfl: 0    No Known Allergies    Physical Exam:    There were no vitals taken for this visit.    Constitutional:normal, well developed, well nourished, alert, in no distress and non-toxic and no overt pain behavior.  Eyes:anicteric  HEENT:grossly intact  Neck:supple, symmetric, trachea midline and no masses   Pulmonary:even and unlabored  Cardiovascular:No edema or pitting edema present  Skin:Normal without rashes or lesions and well hydrated  Psychiatric:Mood and affect appropriate  Neurologic:Cranial Nerves II-XII grossly intact  Musculoskeletal:normal gait. Paresthesias in BLUE. Mild TTP in midline cervical spine. Pain with rotation of the cervical spine.       Imaging  No orders to display         No orders of the defined types were placed in this encounter.

## 2025-03-24 ENCOUNTER — TELEPHONE (OUTPATIENT)
Age: 65
End: 2025-03-24

## 2025-03-24 ENCOUNTER — TELEPHONE (OUTPATIENT)
Dept: PAIN MEDICINE | Facility: CLINIC | Age: 65
End: 2025-03-24

## 2025-03-24 DIAGNOSIS — M54.12 CERVICAL RADICULOPATHY: Primary | ICD-10-CM

## 2025-03-24 RX ORDER — ACETAMINOPHEN AND CODEINE PHOSPHATE 300; 30 MG/1; MG/1
1 TABLET ORAL EVERY 8 HOURS PRN
Qty: 30 TABLET | Refills: 0 | OUTPATIENT
Start: 2025-03-24

## 2025-03-24 RX ORDER — PREGABALIN 100 MG/1
100 CAPSULE ORAL 3 TIMES DAILY
Qty: 90 CAPSULE | Refills: 1 | Status: SHIPPED | OUTPATIENT
Start: 2025-03-24 | End: 2025-05-23

## 2025-03-24 NOTE — TELEPHONE ENCOUNTER
This was prescribed by his Neurosurgeon. He should reach out to them for refills. If they do not want to fill it, I'm happy to take over he would just have to come into the office for opioid agreement.

## 2025-03-24 NOTE — TELEPHONE ENCOUNTER
Pt states that last time he was seen the doctor told him that he would continue to rx the tylenol with codeine so they are asking for a refill for that, please send to   [facility]/pharmacy #[id number] - [place], [place] - [address]       Also pt was wondering if the lyrica medication can be increased in strength and also refilled to the same pharmacy.      Please call pt to nanci

## 2025-03-24 NOTE — TELEPHONE ENCOUNTER
Patient called rx refill line needing a refill on Pregabalin, although inquiring on an increase due to ongoing pain.   Patient reports he completed the Dexamethasone taper and the pain in between his shoulder blades and right arm remains persistent.   Pain rating 5/10. Described as Constant within back. Right arm is Sharp, like a lightening bolt.   Please send prescription to [facility] pharmacy #[id number].

## 2025-03-24 NOTE — TELEPHONE ENCOUNTER
Patient was informed that Doctor Sharpe is willing to maintain refills moving forward.     Reason for call:   [x] Refill   [] Prior Auth  [] Other:     Office:   [] PCP/Provider -   [x] Specialty/Provider - Chris Sharpe MD / [facility]    Medication: acetaminophen-codeine (TYLENOL with CODEINE #3) 300-30 MG per tablet / Take 1 tablet by mouth every 8 (eight) hours as needed    Pharmacy: [facility]/pharmacy #[id number] - [place], [place] - [address] [facility]   Does the patient have enough for 3 days?   [] Yes   [x] No - Send as HP to POD

## 2025-04-15 ENCOUNTER — TELEPHONE (OUTPATIENT)
Dept: PAIN MEDICINE | Facility: CLINIC | Age: 65
End: 2025-04-15

## 2025-04-15 ENCOUNTER — HOSPITAL ENCOUNTER (OUTPATIENT)
Facility: AMBULARY SURGERY CENTER | Age: 65
Setting detail: OUTPATIENT SURGERY
Discharge: HOME | End: 2025-04-15
Attending: STUDENT IN AN ORGANIZED HEALTH CARE EDUCATION/TRAINING PROGRAM | Admitting: STUDENT IN AN ORGANIZED HEALTH CARE EDUCATION/TRAINING PROGRAM
Payer: MEDICARE

## 2025-04-15 ENCOUNTER — APPOINTMENT (OUTPATIENT)
Dept: RADIOLOGY | Facility: HOSPITAL | Age: 65
End: 2025-04-15
Payer: MEDICARE

## 2025-04-15 VITALS
BODY MASS INDEX: 29.73 KG/M2 | TEMPERATURE: 96.4 F | RESPIRATION RATE: 18 BRPM | HEART RATE: 87 BPM | SYSTOLIC BLOOD PRESSURE: 131 MMHG | DIASTOLIC BLOOD PRESSURE: 82 MMHG | WEIGHT: 185 LBS | OXYGEN SATURATION: 98 % | HEIGHT: 66 IN

## 2025-04-15 DIAGNOSIS — M54.16 LUMBAR RADICULOPATHY: ICD-10-CM

## 2025-04-15 DIAGNOSIS — M54.12 CERVICAL RADICULOPATHY: Primary | ICD-10-CM

## 2025-04-15 PROCEDURE — 62323 NJX INTERLAMINAR LMBR/SAC: CPT | Performed by: STUDENT IN AN ORGANIZED HEALTH CARE EDUCATION/TRAINING PROGRAM

## 2025-04-15 RX ORDER — DULOXETIN HYDROCHLORIDE 30 MG/1
30 CAPSULE, DELAYED RELEASE ORAL DAILY
Qty: 30 CAPSULE | Refills: 0 | Status: SHIPPED | OUTPATIENT
Start: 2025-04-15 | End: 2025-05-15

## 2025-04-15 RX ORDER — METHYLPREDNISOLONE ACETATE 80 MG/ML
INJECTION, SUSPENSION INTRA-ARTICULAR; INTRALESIONAL; INTRAMUSCULAR; SOFT TISSUE AS NEEDED
Status: DISCONTINUED | OUTPATIENT
Start: 2025-04-15 | End: 2025-04-15 | Stop reason: HOSPADM

## 2025-04-15 RX ORDER — LIDOCAINE HYDROCHLORIDE 10 MG/ML
INJECTION, SOLUTION EPIDURAL; INFILTRATION; INTRACAUDAL; PERINEURAL AS NEEDED
Status: DISCONTINUED | OUTPATIENT
Start: 2025-04-15 | End: 2025-04-15 | Stop reason: HOSPADM

## 2025-04-15 RX ORDER — PREGABALIN 50 MG/1
50 CAPSULE ORAL 3 TIMES DAILY
Qty: 90 CAPSULE | Refills: 0 | Status: SHIPPED | OUTPATIENT
Start: 2025-04-15 | End: 2025-05-15

## 2025-04-15 RX ORDER — BUPIVACAINE HYDROCHLORIDE 2.5 MG/ML
INJECTION, SOLUTION EPIDURAL; INFILTRATION; INTRACAUDAL; PERINEURAL AS NEEDED
Status: DISCONTINUED | OUTPATIENT
Start: 2025-04-15 | End: 2025-04-15 | Stop reason: HOSPADM

## 2025-04-15 NOTE — DISCHARGE INSTR - AVS FIRST PAGE
Will wean off Lyrica. Reduce to 100/50/100 for three days, 100 in am and pm for three days, 50 in am and 100 in pm for three days, 100 nightly for three days, 50 nightly for three days and then off. Will start duloxetine 30 mg nightly.

## 2025-04-15 NOTE — DISCHARGE INSTRUCTIONS
Epidural Steroid Injection   WHAT YOU NEED TO KNOW:   An epidural steroid injection (COLBY) is a procedure to inject steroid medicine into the epidural space. The epidural space is between your spinal cord and vertebrae. Steroids reduce inflammation and fluid buildup in your spine that may be causing pain. You may be given pain medicine along with the steroids.          ACTIVITY  Do not drive or operate machinery today.  No strenuous activity today - bending, lifting, etc.  You may resume normal activites starting tomorrow - start slowly and as tolerated.  You may shower today, but no tub baths or hot tubs.  You may have numbness for several hours from the local anesthetic. Please use caution and common sense, especially with weight-bearing activities.    CARE OF THE INJECTION SITE  If you have soreness or pain, apply ice to the area today (20 minutes on/20 minutes off).  Starting tomorrow, you may use warm, moist heat or ice if needed.  You may have an increase or change in your discomfort for 36-48 hours after your treatment.  Apply ice and continue with any pain medication you have been prescribed.  Notify the [facility] if you have any of the following: redness, drainage, swelling, headache, stiff neck or fever above 100°F.    SPECIAL INSTRUCTIONS  Our office will contact you in approximately 14 days for a progress report.    MEDICATIONS  Continue to take all routine medications.  Our office may have instructed you to hold some medications.    As no general anesthesia was used in today's procedure, you should not experience any side effects related to anesthesia.     If you are diabetic, the steroids used in today's injection may temporarily increase your blood sugar levels after the first few days after your injection. Please keep a close eye on your sugars and alert the doctor who manages your diabetes if your sugars are significantly high from your baseline or you are symptomatic.     If you have a  problem specifically related to your procedure, please call our office at [phone].  Problems not related to your procedure should be directed to your primary care physician.

## 2025-04-15 NOTE — TELEPHONE ENCOUNTER
Patient called the RX Refill Line. Message is being forwarded to the office.     Patient called stating Dr Sharpe wants to wean him off of lyrica  and states he needs a script for lyrica 50 mg, 9 tablets. To be sent to [facility].    Patient states he is completely out of  lyrica medicication.      Please contact patient at [phone]

## 2025-04-15 NOTE — H&P
History of Present Illness: The patient is a 64 y.o. male who presents with complaints of low back pain.     Past Medical History:   Diagnosis Date    Chest discomfort     Lung cancer (HCC)        Past Surgical History:   Procedure Laterality Date    EPIDURAL BLOCK INJECTION N/A 12/20/2024    Procedure: L4-L5 LUMBAR EPIDURAL STEROID INJECTION;  Surgeon: Chris Sharpe MD;  Location: [facility] MAIN OR;  Service: Pain Management     EPIDURAL BLOCK INJECTION N/A 1/17/2025    Procedure: C7-T1 CERVICAL EPIDURAL STEROID INJECTION;  Surgeon: Chris Sharpe MD;  Location: [facility] MAIN OR;  Service: Pain Management     EPIDURAL BLOCK INJECTION N/A 2/18/2025    Procedure: C6-C7 CERVICAL EPIDURAL STEROID INJECTION;  Surgeon: Chris Sharpe MD;  Location: [facility] MAIN OR;  Service: Pain Management     LUNG LOBECTOMY      upper    TONSILLECTOMY         No current facility-administered medications for this encounter.    No Known Allergies    Physical Exam:   Vitals:    04/15/25 0720   BP: 134/86   Pulse: 95   Resp: 18   Temp: (!) 96.4 °F (35.8 °C)   SpO2: 96%     General: Awake, Alert, Oriented x 3, Mood and affect appropriate  Respiratory: Respirations even and unlabored  Cardiovascular: Peripheral pulses intact; no edema  Musculoskeletal Exam: low back pain.     ASA Score: 3    Patient/Chart Verification  Patient ID Verified: Verbal, Armband  ID Band Applied: Yes  H&P( within 30 days) Verified: Yes  Interval H&P(within 24 hr) Complete (required for Outpatients and Surgery Admit only): Yes  Allergies Reviewed: Yes  Beta Blocker given : N/A    Assessment: Lumbar radiculopathy    Plan: L4-L5 LESI

## 2025-04-15 NOTE — OP NOTE
Pre-procedure Diagnosis: Lumbar radiculopathy  Post-procedure Diagnosis: Lumbar radiculopathy  Procedure Title(s):  1.  L4-L5 interlaminar epidural steroid injection      2. Intraoperative fluoroscopy  Attending Surgeon:   Chris Sharpe MD  Anesthesia:   Local     Indications: The patient is a 64 y.o. year-old male with a diagnosis of lumbar radiculopathy. The patient's history and physical exam were reviewed.  The risks, benefits and alternatives to the procedure were discussed, and all questions were answered to the patient's satisfaction. The patient agreed to proceed, and written informed consent was obtained.    Procedure in Detail: The patient was brought into the procedure room and placed in the prone position on the fluoroscopy table. The area of the lumbar spine was prepped with chlorhexidine gluconate solution times one and draped in a sterile manner.    The L4-S1 5 interspace was identified and marked under AP fluoroscopy. The skin and subcutaneous tissues in the area were anesthetized with 1% lidocaine. A 20-gauge Tuohy epidural needle was directed toward the interspace under fluoroscopic guidance until the ligamentum flavum was engaged. From this point, a loss of resistance technique with air was used to identify entrance of the needle into the epidural space. Once an appropriate loss was obtained, negative aspiration was confirmed, and 1 ml Omnipaque 300 contrast solution was injected. An appropriate epidurogram was noted.    Then, after negative aspiration, a solution consisting of 1-mL depo-medrol (80mg/mL) and 1-mL bupivacaine 0.25% and 3-mL preservative-free saline was easily injected. The needle was removed with a 1% lidocaine flush. The patient's back was cleaned and a bandage was placed over the site of needle insertion.    Disposition: The patient tolerated the procedure well, and there were no apparent complications. The patient was taken to the recovery area where written discharge  instructions for the procedure were given.     Estimated Blood Loss: None  Specimens Obtained: N/A

## 2025-04-29 ENCOUNTER — TELEPHONE (OUTPATIENT)
Age: 65
End: 2025-04-29

## 2025-04-29 ENCOUNTER — TELEPHONE (OUTPATIENT)
Dept: PAIN MEDICINE | Facility: MEDICAL CENTER | Age: 65
End: 2025-04-29

## 2025-04-29 NOTE — TELEPHONE ENCOUNTER
Natalia called in to get a verbal referral for home care for nursing and PT.  Patient had been in [facility] for seizures.  She stated that after confirmation, she would then fax paperwork.  Please advise.

## 2025-05-02 DIAGNOSIS — M54.16 LUMBAR RADICULOPATHY: Primary | ICD-10-CM

## 2025-05-02 DIAGNOSIS — M54.12 CERVICAL RADICULOPATHY: ICD-10-CM

## 2025-05-02 RX ORDER — NABUMETONE 500 MG/1
500 TABLET, FILM COATED ORAL 2 TIMES DAILY
Qty: 28 TABLET | Refills: 0 | Status: SHIPPED | OUTPATIENT
Start: 2025-05-02 | End: 2025-05-16

## 2025-05-05 ENCOUNTER — TELEPHONE (OUTPATIENT)
Age: 65
End: 2025-05-05

## 2025-05-05 NOTE — TELEPHONE ENCOUNTER
The patient called he needs to schedule a tcm appointment    he was discharged from the hospital last Thursday  thank you

## 2025-05-09 ENCOUNTER — RA CDI HCC (OUTPATIENT)
Dept: OTHER | Facility: HOSPITAL | Age: 65
End: 2025-05-09

## 2025-05-12 ENCOUNTER — OFFICE VISIT (OUTPATIENT)
Dept: FAMILY MEDICINE CLINIC | Facility: CLINIC | Age: 65
End: 2025-05-12
Payer: MEDICARE

## 2025-05-12 VITALS
WEIGHT: 182 LBS | OXYGEN SATURATION: 95 % | TEMPERATURE: 98.7 F | BODY MASS INDEX: 29.25 KG/M2 | HEART RATE: 103 BPM | HEIGHT: 66 IN | RESPIRATION RATE: 14 BRPM | SYSTOLIC BLOOD PRESSURE: 122 MMHG | DIASTOLIC BLOOD PRESSURE: 66 MMHG

## 2025-05-12 DIAGNOSIS — J30.89 ENVIRONMENTAL AND SEASONAL ALLERGIES: ICD-10-CM

## 2025-05-12 DIAGNOSIS — M54.16 LUMBAR RADICULOPATHY: ICD-10-CM

## 2025-05-12 DIAGNOSIS — C34.12 MALIGNANT NEOPLASM OF UPPER LOBE, LEFT BRONCHUS OR LUNG (HCC): ICD-10-CM

## 2025-05-12 DIAGNOSIS — J44.9 CHRONIC OBSTRUCTIVE PULMONARY DISEASE, UNSPECIFIED COPD TYPE (HCC): ICD-10-CM

## 2025-05-12 DIAGNOSIS — E04.2 MULTINODULAR GOITER: ICD-10-CM

## 2025-05-12 DIAGNOSIS — M54.12 CERVICAL RADICULOPATHY: ICD-10-CM

## 2025-05-12 DIAGNOSIS — I20.81 ANGINA PECTORIS WITH CORONARY MICROVASCULAR DYSFUNCTION (HCC): ICD-10-CM

## 2025-05-12 DIAGNOSIS — G40.909 SEIZURE DISORDER (HCC): Primary | ICD-10-CM

## 2025-05-12 PROCEDURE — 99495 TRANSJ CARE MGMT MOD F2F 14D: CPT | Performed by: STUDENT IN AN ORGANIZED HEALTH CARE EDUCATION/TRAINING PROGRAM

## 2025-05-12 RX ORDER — AMLODIPINE BESYLATE 5 MG/1
5 TABLET ORAL DAILY
COMMUNITY
Start: 2025-05-01 | End: 2025-05-31

## 2025-05-12 RX ORDER — LEVETIRACETAM 750 MG/1
1500 TABLET ORAL 2 TIMES DAILY
COMMUNITY
Start: 2025-04-28

## 2025-05-12 RX ORDER — LACOSAMIDE 200 MG/1
200 TABLET ORAL 2 TIMES DAILY
COMMUNITY

## 2025-05-12 RX ORDER — FLUTICASONE PROPIONATE 50 MCG
2 SPRAY, SUSPENSION (ML) NASAL DAILY
Qty: 16 G | Refills: 0 | Status: SHIPPED | OUTPATIENT
Start: 2025-05-12

## 2025-05-12 NOTE — PROGRESS NOTES
Transition of Care Visit:  Name: Miguel Rodgers      : 1960      MRN: [id number]  Encounter Provider: Orlando Eduardo DO  Encounter Date: 2025   Encounter department: [facility]    Assessment & Plan  Seizure disorder (HCC)  Recent hospitalization for new onset seizure, now controlled with Vimpat and Keppra.  Imaging reviewed, repeat MRI and LP planned with neurology.    Patient has had history of lung cancer, no metastasis per documentation.    Patient will follow-up after full workup with neurology out of network, appreciate recommendations.    Patient is doing well today, moving all 4 extremities, alert and oriented x 3.       Angina pectoris with coronary microvascular dysfunction (HCC)  History noted, follows with cardiology, continue amlodipine       Malignant neoplasm of upper lobe, left bronchus or lung (HCC)  History noted, no signs of recurrence       Cervical radiculopathy  Follows with spine and pain management       Lumbar radiculopathy  Follows with spine and pain management, continue Cymbalta and NSAID       Chronic obstructive pulmonary disease, unspecified COPD type (HCC)  Lungs overall clear to auscultation bilaterally, s/p left upper lumpectomy       Multinodular goiter  Follow up biopsy pending given imaging results       Environmental and seasonal allergies  Patient appears to be having allergic rhinitis, recommend Flonase  Orders:  •  fluticasone (FLONASE) 50 mcg/act nasal spray; 2 sprays into each nostril daily         History of Present Illness     Transitional Care Management Review:   Miguel Rodgers is a 65 y.o. male here for TCM follow up.     During the TCM phone call patient stated:    Follow up TCM      Review of Systems   Constitutional:  Negative for chills and fever.   HENT:  Negative for ear pain and sore throat.    Eyes:  Negative for pain and visual disturbance.   Respiratory:  Negative for cough and shortness of breath.    Cardiovascular:  " Negative for chest pain and palpitations.   Gastrointestinal:  Negative for abdominal pain and vomiting.   Genitourinary:  Negative for dysuria and hematuria.   Musculoskeletal:  Positive for back pain. Negative for arthralgias.   Skin:  Negative for color change and rash.   Neurological:  Negative for seizures and syncope.   Psychiatric/Behavioral:  Negative for agitation, behavioral problems and confusion.    All other systems reviewed and are negative.    Objective   /66 (BP Location: Left arm, Patient Position: Sitting, Cuff Size: Standard)   Pulse 103   Temp 98.7 °F (37.1 °C) (Temporal)   Resp 14   Ht 5' 6\" (1.676 m)   Wt 82.6 kg (182 lb)   SpO2 95%   BMI 29.38 kg/m²     Physical Exam  Vitals and nursing note reviewed.   Constitutional:       General: He is not in acute distress.     Appearance: He is well-developed.   HENT:      Head: Normocephalic and atraumatic.   Eyes:      General: No scleral icterus.     Conjunctiva/sclera: Conjunctivae normal.   Cardiovascular:      Rate and Rhythm: Normal rate and regular rhythm.      Pulses: Normal pulses.      Heart sounds: No murmur heard.  Pulmonary:      Effort: Pulmonary effort is normal. No respiratory distress.      Breath sounds: Normal breath sounds.   Abdominal:      General: Bowel sounds are normal. There is no distension.      Palpations: Abdomen is soft.      Tenderness: There is no abdominal tenderness.   Musculoskeletal:         General: No swelling. Normal range of motion.      Cervical back: Neck supple.   Skin:     General: Skin is warm and dry.      Capillary Refill: Capillary refill takes less than 2 seconds.   Neurological:      General: No focal deficit present.      Mental Status: He is alert and oriented to person, place, and time. Mental status is at baseline.   Psychiatric:         Mood and Affect: Mood normal.         Behavior: Behavior normal.       Medications have been reviewed by provider in current encounter      "

## 2025-05-12 NOTE — ASSESSMENT & PLAN NOTE
Recent hospitalization for new onset seizure, now controlled with Vimpat and Keppra.  Imaging reviewed, repeat MRI and LP planned with neurology.    Patient has had history of lung cancer, no metastasis per documentation.    Patient will follow-up after full workup with neurology out of network, appreciate recommendations.    Patient is doing well today, moving all 4 extremities, alert and oriented x 3.

## 2025-05-22 ENCOUNTER — TELEPHONE (OUTPATIENT)
Age: 65
End: 2025-05-22

## 2025-05-22 DIAGNOSIS — R26.2 AMBULATORY DYSFUNCTION: ICD-10-CM

## 2025-05-22 DIAGNOSIS — R13.14 PHARYNGOESOPHAGEAL DYSPHAGIA: Primary | ICD-10-CM

## 2025-05-22 NOTE — TELEPHONE ENCOUNTER
Patients wife called and stated that her  would like to try PT again for the pain he is experiencing all over his body.  Patients wife stated that Dr. Eduardo is aware of this issue and sent PT order before. Patient did 2 visit with  PT and patients wife stated it was not helpful.  They would like to bring patient to  Physical Therapy [facility] in [place].      PT orders in chart are for Diagnosis:  Cervicalgia.  But patients wife expressed its for pain all over that Dr. Eduardo is aware of.  Can this order be used, or can another order be generated for patient?      Please have Dr. Eduardo review and call patients wife once order is ready for

## 2025-05-22 NOTE — TELEPHONE ENCOUNTER
Tori from [facility] called because the patient is complaining that when he is trying to eat or take his pills he is starting to choke. She just wanted to inform the provider that she will be putting in an request for a Speech Therapy evaluation. Thank you.

## 2025-05-22 NOTE — TELEPHONE ENCOUNTER
Pt's wife Muriel requested speech therapy script. Please send script to  [phone]. Please contact Muriel and advise when it is done. Thank you.

## 2025-05-22 NOTE — TELEPHONE ENCOUNTER
If Primary Care Provider is agreeable to referral please fax to     [facility]   Fax [phone]

## 2025-05-22 NOTE — TELEPHONE ENCOUNTER
Tori from [facility]  called to request a physical therapy and speech therapy outpatient referral for the patient since they are not able to do both in home.     Please Advice and contact the patient     Please see the message above

## 2025-05-23 ENCOUNTER — TELEPHONE (OUTPATIENT)
Age: 65
End: 2025-05-23

## 2025-05-23 NOTE — TELEPHONE ENCOUNTER
Patient's spouse Muriel called in to follow up on referrals for physical therapy and speech therapy. RN confirmed referrals are ordered. Muriel is requesting if the orders can be faxed to their respective departments this afternoon so she can call to get patient scheduled.     Please fax to [facility]:   Fax # for physical therapy  #[phone]  Fax # for speech therapy to # [phone]  Please follow up with Muriel for advise faxes were sent. Thank you.

## 2025-06-03 DIAGNOSIS — J30.89 ENVIRONMENTAL AND SEASONAL ALLERGIES: ICD-10-CM

## 2025-06-05 RX ORDER — FLUTICASONE PROPIONATE 50 MCG
SPRAY, SUSPENSION (ML) NASAL
Qty: 48 ML | Refills: 1 | Status: SHIPPED | OUTPATIENT
Start: 2025-06-05

## 2025-06-11 ENCOUNTER — TELEPHONE (OUTPATIENT)
Age: 65
End: 2025-06-11

## 2025-06-11 NOTE — TELEPHONE ENCOUNTER
Patient's wife calling stating Diagnosis needs to be added to PT order.  She stated in order for them to do exercises with him the order needs to state Lower Back Pain.    Can we please add to order and fax.  Thank you    [facility]  [phone].

## 2025-06-12 DIAGNOSIS — M54.50 CHRONIC BILATERAL LOW BACK PAIN WITHOUT SCIATICA: Primary | ICD-10-CM

## 2025-06-12 DIAGNOSIS — G89.29 CHRONIC BILATERAL LOW BACK PAIN WITHOUT SCIATICA: Primary | ICD-10-CM

## 2025-06-24 DIAGNOSIS — M54.16 LUMBAR RADICULOPATHY: Primary | ICD-10-CM

## 2025-06-24 DIAGNOSIS — M54.12 CERVICAL RADICULOPATHY: Primary | ICD-10-CM

## 2025-06-24 RX ORDER — DULOXETIN HYDROCHLORIDE 20 MG/1
40 CAPSULE, DELAYED RELEASE ORAL DAILY
Qty: 60 CAPSULE | Refills: 0 | Status: SHIPPED | OUTPATIENT
Start: 2025-06-24 | End: 2025-06-24

## 2025-06-25 DIAGNOSIS — M54.12 CERVICAL RADICULOPATHY: Primary | ICD-10-CM

## 2025-06-25 RX ORDER — TRAMADOL HYDROCHLORIDE 50 MG/1
50 TABLET ORAL 3 TIMES DAILY PRN
Qty: 21 TABLET | Refills: 0 | Status: SHIPPED | OUTPATIENT
Start: 2025-06-25 | End: 2025-07-02

## 2025-06-26 ENCOUNTER — OFFICE VISIT (OUTPATIENT)
Dept: PAIN MEDICINE | Facility: CLINIC | Age: 65
End: 2025-06-26
Payer: MEDICARE

## 2025-06-26 DIAGNOSIS — Z79.891 LONG-TERM CURRENT USE OF OPIATE ANALGESIC: Primary | ICD-10-CM

## 2025-06-26 DIAGNOSIS — G89.4 CHRONIC PAIN SYNDROME: ICD-10-CM

## 2025-06-26 DIAGNOSIS — G40.909 SEIZURE DISORDER (HCC): ICD-10-CM

## 2025-06-26 DIAGNOSIS — T45.1X5A AGRANULOCYTOSIS SECONDARY TO CANCER CHEMOTHERAPY (HCC): ICD-10-CM

## 2025-06-26 DIAGNOSIS — D70.1 AGRANULOCYTOSIS SECONDARY TO CANCER CHEMOTHERAPY (HCC): ICD-10-CM

## 2025-06-26 DIAGNOSIS — M54.12 CERVICAL RADICULOPATHY: ICD-10-CM

## 2025-06-26 DIAGNOSIS — C34.12 MALIGNANT NEOPLASM OF UPPER LOBE, LEFT BRONCHUS OR LUNG (HCC): ICD-10-CM

## 2025-06-26 DIAGNOSIS — F11.20 UNCOMPLICATED OPIOID DEPENDENCE (HCC): ICD-10-CM

## 2025-06-26 PROCEDURE — G2211 COMPLEX E/M VISIT ADD ON: HCPCS | Performed by: STUDENT IN AN ORGANIZED HEALTH CARE EDUCATION/TRAINING PROGRAM

## 2025-06-26 PROCEDURE — 99214 OFFICE O/P EST MOD 30 MIN: CPT | Performed by: STUDENT IN AN ORGANIZED HEALTH CARE EDUCATION/TRAINING PROGRAM

## 2025-06-26 RX ORDER — AMLODIPINE BESYLATE 5 MG/1
5 TABLET ORAL DAILY
COMMUNITY

## 2025-06-26 NOTE — PROGRESS NOTES
Name: Miguel Rodgers      : 1960      MRN: [id number]  Encounter Provider: Chris Sharpe MD  Encounter Date: 2025   Encounter department: [facility]  :  Assessment & Plan  Long-term current use of opiate analgesic    Orders:  •  MM ALL_Prescribed Meds and Special Instructions  •  MM DT_Alprazolam Definitive Test  •  MM DT_Amphetamine Definitive Test  •  MM DT_Buprenorphine Definitive Test  •  MM DT_Citalopram/Escitalopram Definitive Test  •  MM DT_Clonazepam Definitive Test  •  MM DT_Cocaine Definitive Test  •  MM DT_Codeine Definitive Test  •  MM Diazepam Definitive Test  •  MM DT_Ethyl Glucuronide/Ethyl Sulfate Definitive Test  •  MM DT_Fentanyl Definitive Test  •  MM DT_Heroin Definitive Test  •  MM DT_Hydrocodone Definitive Test  •  MM DT_Hydromorphone Definitive Test  •  MM Lorazepam Definitive Test  •  MM DT_MDMA Definitive Test  •  MM DT_Methadone Definitive Test  •  MM DT_Methamphetamine Definitive Test  •  MM DT_Methylphenidate Definitive Test  •  MM DT_Morphine Definitive Test  •  MM DT_Oxazepam Definitive Test  •  MM DT_Oxycodone Definitive Test  •  MM DT_Oxymorphone Definitive Test  •  MM DT_Phentermine Definitive Test  •  MM DT_Pregablin Definitive  •  MM DT_Tapentadol Definitive Test  •  MM DT_Temazapam Definitive Test  •  MM DT_THC Definitive Test  •  MM DT_Tramadol Definitive Test  •  MM DT_Validity Creatinine  •  MM DT_Validity Oxidant  •  MM DT_Validity pH  •  MM DT_Validity Specific    Uncomplicated opioid dependence (HCC)    Orders:  •  MM ALL_Prescribed Meds and Special Instructions  •  MM DT_Alprazolam Definitive Test  •  MM DT_Amphetamine Definitive Test  •  MM DT_Buprenorphine Definitive Test  •  MM DT_Citalopram/Escitalopram Definitive Test  •  MM DT_Clonazepam Definitive Test  •  MM DT_Cocaine Definitive Test  •  MM DT_Codeine Definitive Test  •  MM Diazepam Definitive Test  •  MM DT_Ethyl Glucuronide/Ethyl Sulfate Definitive Test  •  MM DT_Fentanyl  Definitive Test  •  MM DT_Heroin Definitive Test  •  MM DT_Hydrocodone Definitive Test  •  MM DT_Hydromorphone Definitive Test  •  MM Lorazepam Definitive Test  •  MM DT_MDMA Definitive Test  •  MM DT_Methadone Definitive Test  •  MM DT_Methamphetamine Definitive Test  •  MM DT_Methylphenidate Definitive Test  •  MM DT_Morphine Definitive Test  •  MM DT_Oxazepam Definitive Test  •  MM DT_Oxycodone Definitive Test  •  MM DT_Oxymorphone Definitive Test  •  MM DT_Phentermine Definitive Test  •  MM DT_Pregablin Definitive  •  MM DT_Tapentadol Definitive Test  •  MM DT_Temazapam Definitive Test  •  MM DT_THC Definitive Test  •  MM DT_Tramadol Definitive Test  •  MM DT_Validity Creatinine  •  MM DT_Validity Oxidant  •  MM DT_Validity pH  •  MM DT_Validity Specific    Chronic pain syndrome    Orders:  •  MM ALL_Prescribed Meds and Special Instructions  •  MM DT_Alprazolam Definitive Test  •  MM DT_Amphetamine Definitive Test  •  MM DT_Buprenorphine Definitive Test  •  MM DT_Citalopram/Escitalopram Definitive Test  •  MM DT_Clonazepam Definitive Test  •  MM DT_Cocaine Definitive Test  •  MM DT_Codeine Definitive Test  •  MM Diazepam Definitive Test  •  MM DT_Ethyl Glucuronide/Ethyl Sulfate Definitive Test  •  MM DT_Fentanyl Definitive Test  •  MM DT_Heroin Definitive Test  •  MM DT_Hydrocodone Definitive Test  •  MM DT_Hydromorphone Definitive Test  •  MM Lorazepam Definitive Test  •  MM DT_MDMA Definitive Test  •  MM DT_Methadone Definitive Test  •  MM DT_Methamphetamine Definitive Test  •  MM DT_Methylphenidate Definitive Test  •  MM DT_Morphine Definitive Test  •  MM DT_Oxazepam Definitive Test  •  MM DT_Oxycodone Definitive Test  •  MM DT_Oxymorphone Definitive Test  •  MM DT_Phentermine Definitive Test  •  MM DT_Pregablin Definitive  •  MM DT_Tapentadol Definitive Test  •  MM DT_Temazapam Definitive Test  •  MM DT_THC Definitive Test  •  MM DT_Tramadol Definitive Test  •  MM DT_Validity Creatinine  •  MM DT_Validity  Oxidant  •  MM DT_Validity pH  •  MM DT_Validity Specific    Seizure disorder (HCC)         Agranulocytosis secondary to cancer chemotherapy (HCC)         Cervical radiculopathy         Malignant neoplasm of upper lobe, left bronchus or lung (HCC)         Patient is a pleasant six 5-year-old male who presents as a follow-up visit for chronic opiate management.  Patient continues complain of low back pain with right-sided radicular symptoms.  Unfortunately patient not received significant benefit from injection therapy.  Patient has also failed multiple neuropathic agents.  Given this patient was started on tramadol 50 mg 3 times daily as needed.  Opiate agreement and UDS obtained in the office today.  Patient rates pain 7 out of 10 numeric pain scale.  The pain is constant and the quality pain is a dull aching, sharp, pressure-like, shooting, numbing sensation the pain is interfere with his daily activities.    -Continue Tramadol 50 mg TID prn; currently has 7 day script, if effective will send 90 day script.   -Continue apap 975 mg TID prn     An oral drug screen swab was collected at today's office visit. The swab will be sent for confirmatory testing. The drug screen is medically necessary because the patient is either dependent on opioid medication or is being considered for opioid medication therapy and the results could impact ongoing or future treatment. The drug screen is to evaluate for the presences or absence of prescribed, non-prescribed, and/or illicit drugs/substances.   There are risks associated with opioid medications, including dependence, addiction and tolerance. The patient understands and agrees to use these medications only as prescribed. Potential side effects of the medications include, but are not limited to, constipation, drowsiness, addiction, impaired judgment and risk of fatal overdose if not taken as prescribed. The patient was warned against driving while taking sedation medications.   Sharing medications is a felony. At this point in time, the patient is showing no signs of addiction, abuse, diversion or suicidal ideation.  A urine drug screen was collected at today's office visit as part of our medication management protocol. The point of care testing results were appropriate for what was being prescribed. The specimen will be sent for confirmatory testing. The drug screen is medically necessary because the patient is either dependent on opioid medication or is being considered for opioid medication therapy and the results could impact ongoing or future treatment. The drug screen is to evaluate for the presences or absence of prescribed, non-prescribed, and/or illicit drugs/substances.  [place] Prescription Drug Monitoring Program report was reviewed and was appropriate    [place] Prescription Drug Monitoring Program report was reviewed and was appropriate      My impressions and treatment recommendations were discussed in detail with the patient who verbalized understanding and had no further questions.  Discharge instructions were provided. I personally saw and examined the patient and I agree with the above discussed plan of care.    History of Present Illness     Miguel Rodgers is a 65 y.o. male who presents for a follow up office visit in regards to No chief complaint on file..     Patient is a pleasant six 5-year-old male who presents as a follow-up visit for chronic opiate management.  Patient continues complain of low back pain with right-sided radicular symptoms.  Unfortunately patient not received significant benefit from injection therapy.  Patient has also failed multiple neuropathic agents.  Given this patient was started on tramadol 50 mg 3 times daily as needed.  Opiate agreement and UDS obtained in the office today.  Patient rates pain 7 out of 10 numeric pain scale.  The pain is constant and the quality pain is a dull aching, sharp, pressure-like, shooting, numbing  sensation the pain is interfere with his daily activities.        Opioid contract date    Last UDS Date: No results in last 5 years                    last taken on    Review of Systems   Constitutional:  Negative for unexpected weight change.   HENT:  Negative for ear pain.    Eyes:  Negative for visual disturbance.   Respiratory:  Negative for shortness of breath and wheezing.    Gastrointestinal:  Negative for abdominal pain.   Musculoskeletal:  Positive for back pain and gait problem.        Decreased ROM, joint and muscle pain   Neurological:  Positive for dizziness, weakness and headaches. Negative for numbness.   Psychiatric/Behavioral:  Positive for sleep disturbance. Negative for decreased concentration.        Medical History Reviewed by provider this encounter:  Tobacco  Allergies  Meds  Problems  Med Hx  Surg Hx  Fam Hx     .       Objective   There were no vitals taken for this visit.     Pain Score:   7  Physical Exam  Constitutional: normal, well developed, well nourished, alert, in no distress and non-toxic and no overt pain behavior.  Eyes: anicteric  HEENT: grossly intact  Neck: supple, symmetric, trachea midline and no masses   Pulmonary: even and unlabored  Cardiovascular: No edema or pitting edema present  Skin: Normal without rashes or lesions and well hydrated  Psychiatric: Mood and affect appropriate  Neurologic: Cranial Nerves II-XII grossly intact  Musculoskeletal:

## 2025-06-27 ENCOUNTER — TELEPHONE (OUTPATIENT)
Age: 65
End: 2025-06-27

## 2025-06-27 NOTE — TELEPHONE ENCOUNTER
S/w wife Muriel.  Per [facility] okay to continue tylenol 3 times a day. She also states they have THC creme and drops that pt uses sometimes.  Last use was 2 days ago when having a procedure.  She states she informed SJ at the visit.  She is asking if ok to continue using the THC drops as well

## 2025-06-27 NOTE — TELEPHONE ENCOUNTER
Caller: Muriel( pt spouse)     Doctor: Dr. Sharpe     Reason for call: Muriel has a question in regards to pt medication of traMADol (Ultram) 50 mg tablet , If tylenol can be taking with it if the tramadol is not working by itself . Muriel stated she forgot to ask Dr. Sharpe yesterday at pts appt. Please Advise     Call back#: [phone]

## 2025-06-28 LAB
6MAM UR QL CFM: NEGATIVE NG/ML
7AMINOCLONAZEPAM UR QL CFM: NEGATIVE NG/ML
A-OH ALPRAZ UR QL CFM: NEGATIVE NG/ML
ACCEPTABLE CREAT UR QL: NORMAL MG/DL
ACCEPTIBLE SP GR UR QL: NORMAL
AMPHET UR QL CFM: NEGATIVE NG/ML
AMPHET UR QL CFM: NEGATIVE NG/ML
BUPRENORPHINE UR QL CFM: NEGATIVE NG/ML
BZE UR QL CFM: NEGATIVE NG/ML
CODEINE UR QL CFM: NEGATIVE NG/ML
EDDP UR QL CFM: NEGATIVE NG/ML
ETHYL GLUCURONIDE UR QL CFM: NEGATIVE NG/ML
ETHYL SULFATE UR QL SCN: NEGATIVE NG/ML
FENTANYL UR QL CFM: NEGATIVE NG/ML
HYDROCODONE UR QL CFM: NEGATIVE NG/ML
HYDROCODONE UR QL CFM: NEGATIVE NG/ML
HYDROMORPHONE UR QL CFM: NEGATIVE NG/ML
LORAZEPAM UR QL CFM: NEGATIVE NG/ML
MDMA UR QL CFM: NEGATIVE NG/ML
ME-PHENIDATE UR QL CFM: NEGATIVE NG/ML
METHADONE UR QL CFM: NEGATIVE NG/ML
METHAMPHET UR QL CFM: NEGATIVE NG/ML
MORPHINE UR QL CFM: NEGATIVE NG/ML
MORPHINE UR QL CFM: NEGATIVE NG/ML
NITRITE UR QL: NORMAL UG/ML
NORBUPRENORPHINE UR QL CFM: NEGATIVE NG/ML
NORDIAZEPAM UR QL CFM: NEGATIVE NG/ML
NORFENTANYL UR QL CFM: NEGATIVE NG/ML
NORHYDROCODONE UR QL CFM: NEGATIVE NG/ML
NORHYDROCODONE UR QL CFM: NEGATIVE NG/ML
NOROXYCODONE UR QL CFM: NEGATIVE NG/ML
OXAZEPAM UR QL CFM: NEGATIVE NG/ML
OXYCODONE UR QL CFM: NEGATIVE NG/ML
OXYMORPHONE UR QL CFM: NEGATIVE NG/ML
OXYMORPHONE UR QL CFM: NEGATIVE NG/ML
PARA-FLUOROFENTANYL QUANTIFICATION: NORMAL NG/ML
RESULT ALL_PRESCRIBED MEDS AND SPECIAL INSTRUCTIONS: NORMAL
SL AMB ACETYL FENTANYL QUANTIFICATION: NORMAL NG/ML
SL AMB ACETYL NORFENTANYL QUANTIFICATION: NORMAL NG/ML
SL AMB ACRYL FENTANYL QUANTIFICATION: NORMAL NG/ML
SL AMB CARFENTANIL QUANTIFICATION: NORMAL NG/ML
SL AMB CITALOPRAM/ESCITALOPRAM QUANTIFICATION: NEGATIVE NG/ML
SL AMB CITALOPRAM/ESCITALOPRAM QUANTIFICATION: NEGATIVE NG/ML
SL AMB CTHC (MARIJUANA METABOLITE) QUANTIFICATION: ABNORMAL NG/ML
SL AMB N-DESMETHYL-TRAMADOL QUANTIFICATION: NORMAL NG/ML
SL AMB PHENTERMINE QUANTIFICATION: NEGATIVE NG/ML
SL AMB PREGABALIN QUANTIFICATION: NEGATIVE NG/ML
SL AMB RITALINIC ACID QUANTIFICATION: NEGATIVE NG/ML
SPECIMEN PH ACCEPTABLE UR: NORMAL
TAPENTADOL UR QL CFM: NEGATIVE NG/ML
TEMAZEPAM UR QL CFM: NEGATIVE NG/ML
TEMAZEPAM UR QL CFM: NEGATIVE NG/ML
TRAMADOL UR QL CFM: NORMAL NG/ML
URATE/CREAT 24H UR: NORMAL NG/ML

## 2025-07-02 DIAGNOSIS — M54.12 CERVICAL RADICULOPATHY: ICD-10-CM

## 2025-07-02 RX ORDER — TRAMADOL HYDROCHLORIDE 50 MG/1
50 TABLET ORAL 3 TIMES DAILY PRN
Qty: 90 TABLET | Refills: 1 | Status: SHIPPED | OUTPATIENT
Start: 2025-07-02 | End: 2025-08-31

## 2025-07-02 NOTE — TELEPHONE ENCOUNTER
Caller: pts wife    Doctor: Dr. hoffman    Reason for call: pt would like a refill of the tramadol.    Call back#: [phone]

## 2025-07-02 NOTE — TELEPHONE ENCOUNTER
S/w wife and the patient in regards to the Tramadol refill request.  His wife stated that the Tramadol only works if she gives him extra strength tylenol with it. 2 packets of the 500 mg powder. He is taking it three times a day. If she gives him just the Tramadol, he really feels no relief at all.  She stated he went for blood work this am and he had to go into the car and the pain was pretty much unbearable. When they got home, she gave him the medications and now his pain is down to a 2 but that included the tylenol. Inquired about side effects. No dizziness or constipation. He continues with a lot of pain in his hip. Reviewed with them that [facility] will review and advise on effectiveness of medication. Since he is on thickened liquids and can hardly swallow. Expressed to wife that [facility] will review and recommend as appropriate for better consistency in his pain relief.   They appreciated the call.

## 2025-07-22 ENCOUNTER — TELEPHONE (OUTPATIENT)
Age: 65
End: 2025-07-22

## 2025-07-22 ENCOUNTER — OFFICE VISIT (OUTPATIENT)
Age: 65
End: 2025-07-22
Payer: MEDICARE

## 2025-07-22 DIAGNOSIS — M54.16 LUMBAR RADICULOPATHY: Primary | ICD-10-CM

## 2025-07-22 DIAGNOSIS — C34.12 MALIGNANT NEOPLASM OF UPPER LOBE, LEFT BRONCHUS OR LUNG (HCC): ICD-10-CM

## 2025-07-22 PROCEDURE — 99214 OFFICE O/P EST MOD 30 MIN: CPT | Performed by: STUDENT IN AN ORGANIZED HEALTH CARE EDUCATION/TRAINING PROGRAM

## 2025-07-22 PROCEDURE — G2211 COMPLEX E/M VISIT ADD ON: HCPCS | Performed by: STUDENT IN AN ORGANIZED HEALTH CARE EDUCATION/TRAINING PROGRAM

## 2025-07-22 RX ORDER — GABAPENTIN 300 MG/1
300 CAPSULE ORAL
Qty: 30 CAPSULE | Refills: 1 | Status: SHIPPED | OUTPATIENT
Start: 2025-07-22 | End: 2025-09-20

## 2025-07-22 RX ORDER — OXYCODONE AND ACETAMINOPHEN 5; 325 MG/1; MG/1
1 TABLET ORAL EVERY 6 HOURS PRN
Qty: 56 TABLET | Refills: 0 | Status: SHIPPED | OUTPATIENT
Start: 2025-07-22 | End: 2025-08-05

## 2025-07-22 NOTE — PROGRESS NOTES
Name: Miguel Rodgers      : 1960      MRN: [id number]  Encounter Provider: Chris Sharpe MD  Encounter Date: 2025   Encounter department: [facility]  :  Assessment & Plan  Lumbar radiculopathy    Orders:  •  oxyCODONE-acetaminophen (Percocet) 5-325 mg per tablet; Take 1 tablet by mouth every 6 (six) hours as needed for moderate pain for up to 14 days Max Daily Amount: 4 tablets  •  gabapentin (NEURONTIN) 300 mg capsule; Take 1 capsule (300 mg total) by mouth daily at bedtime    Malignant neoplasm of upper lobe, left bronchus or lung (HCC)    Orders:  •  oxyCODONE-acetaminophen (Percocet) 5-325 mg per tablet; Take 1 tablet by mouth every 6 (six) hours as needed for moderate pain for up to 14 days Max Daily Amount: 4 tablets  •  gabapentin (NEURONTIN) 300 mg capsule; Take 1 capsule (300 mg total) by mouth daily at bedtime      Patient is a pleasant six 5-year-old male who presents as a follow-up visit after last being seen For symptoms of lumbar radiculopathy.  Unfortunately since that time there is concern for metastasis of his cancer to his brain.  He is dealing with diffuse pain primarily in his lower back with right-sided radicular symptoms.  He states symptoms are worse rating his pain is 8 out of 10 on numeric rating scale.  The pain is constant and the quality pain is a burning, dull, sharp, throbbing, cramping, pressure, numbing and pins needles like sensation.  The pain does interfere with his daily activities.     Extensive discussion with patient and wife in regards to treatment options.  Will escalate patient's opiate therapy to Percocet 5-3 25 4 times daily.  Will also restart gabapentin 300 mg nightly as I do think patient would benefit from a neuropathic agent.  Discussed with patient that typically if we find we are escalating his opiate without significant benefit to his pain that's when we should involve palliative care.  Additionally if his opioid requirement  is high in the future can consider intrathecal pump placement with the [facility].  Patient to follow-up in 2 months for further medication management.  An oral drug screen swab was collected at today's office visit. The swab will be sent for confirmatory testing. The drug screen is medically necessary because the patient is either dependent on opioid medication or is being considered for opioid medication therapy and the results could impact ongoing or future treatment. The drug screen is to evaluate for the presences or absence of prescribed, non-prescribed, and/or illicit drugs/substances.   There are risks associated with opioid medications, including dependence, addiction and tolerance. The patient understands and agrees to use these medications only as prescribed. Potential side effects of the medications include, but are not limited to, constipation, drowsiness, addiction, impaired judgment and risk of fatal overdose if not taken as prescribed. The patient was warned against driving while taking sedation medications.  Sharing medications is a felony. At this point in time, the patient is showing no signs of addiction, abuse, diversion or suicidal ideation.  A urine drug screen was collected at today's office visit as part of our medication management protocol. The point of care testing results were appropriate for what was being prescribed. The specimen will be sent for confirmatory testing. The drug screen is medically necessary because the patient is either dependent on opioid medication or is being considered for opioid medication therapy and the results could impact ongoing or future treatment. The drug screen is to evaluate for the presences or absence of prescribed, non-prescribed, and/or illicit drugs/substances.  [place] Prescription Drug Monitoring Program report was reviewed and was appropriate    [place] Prescription Drug Monitoring Program report was reviewed and was appropriate       My impressions and treatment recommendations were discussed in detail with the patient who verbalized understanding and had no further questions.  Discharge instructions were provided. I personally saw and examined the patient and I agree with the above discussed plan of care.    History of Present Illness     Miguel Rodgers is a 65 y.o. male who presents for a follow up office visit in regards to No chief complaint on file..  Patient is a pleasant six 5-year-old male who presents as a follow-up visit after last being seen For symptoms of lumbar radiculopathy.  Unfortunately since that time there is concern for metastasis of his cancer to his brain.  He is dealing with diffuse pain primarily in his lower back with right-sided radicular symptoms.  He states symptoms are worse rating his pain is 8 out of 10 on numeric rating scale.  The pain is constant and the quality pain is a burning, dull, sharp, throbbing, cramping, pressure, numbing and pins needles like sensation.  The pain does interfere with his daily activities.        Opioid contract date    Last UDS Date: No results in last 5 years                    last taken on    Review of Systems   Constitutional:  Negative for chills and fatigue.   HENT:  Negative for ear pain, mouth sores and sinus pressure.    Eyes:  Negative for pain, redness and visual disturbance.   Respiratory:  Negative for shortness of breath and wheezing.    Cardiovascular:  Negative for chest pain and palpitations.   Gastrointestinal:  Negative for abdominal pain and nausea.   Endocrine: Negative for polyphagia.   Musculoskeletal:  Positive for back pain and gait problem. Negative for arthralgias and neck pain.        Decreased ROM, joint and muscle pain   Skin:  Negative for wound.   Neurological:  Positive for dizziness, weakness, light-headedness, numbness and headaches. Negative for seizures.   Psychiatric/Behavioral:  Positive for dysphoric mood and sleep disturbance. The patient  is nervous/anxious.        Medical History Reviewed by provider this encounter:  Tobacco  Allergies  Meds  Problems  Med Hx  Surg Hx  Fam Hx     .       Objective   There were no vitals taken for this visit.     Pain Score:   8  Physical Exam  Constitutional: normal, well developed, well nourished, alert, in no distress and non-toxic and no overt pain behavior.  Eyes: anicteric  HEENT: grossly intact  Neck: supple, symmetric, trachea midline and no masses   Pulmonary: even and unlabored  Cardiovascular: No edema or pitting edema present  Skin: Normal without rashes or lesions and well hydrated  Psychiatric: Mood and affect appropriate  Neurologic: Cranial Nerves II-XII grossly intact  Musculoskeletal: ambulates with cane

## 2025-07-22 NOTE — ASSESSMENT & PLAN NOTE
Orders:  •  oxyCODONE-acetaminophen (Percocet) 5-325 mg per tablet; Take 1 tablet by mouth every 6 (six) hours as needed for moderate pain for up to 14 days Max Daily Amount: 4 tablets  •  gabapentin (NEURONTIN) 300 mg capsule; Take 1 capsule (300 mg total) by mouth daily at bedtime

## 2025-07-22 NOTE — TELEPHONE ENCOUNTER
Pts wife wanted to know about the percocet and oxycodone. I let her know that oxycodone is the generic for percocet.

## 2025-07-25 NOTE — TELEPHONE ENCOUNTER
S/w wife michael as per ASHOK and inquired.   She stated she has been noticing that for the past several weeks that he seems more confused, especially at night. He only received 2 doses of the gabapentin 300 mg at HS because she has to give it through his peg tube. He does continue with his other medications. Reviewed that it could be multiple medications causing this and then reviewed his HX including METS. Inquired to see if she contacted his [facility] in regards to his increasing disorientation especially at HS. She stated she does have a call out to them. Reviewed that it could be unfortunately the disease process or it could be multiple factors. He does reorient easily. Reviewed that at this point. He was only on it for 2 days and I would review with [facility] and if anything different I would CB. Emotional support was ongoing and encouraged her to call us if she has nay problems and that we do have oncall over the weekend. She appreciated the call.

## 2025-07-25 NOTE — TELEPHONE ENCOUNTER
Caller: patient's wife    Doctor: Dr. hoffman    Reason for call: patients wife calling to ask if he can stop the gabapentin.  It has only been 2-3 days and she feels it is making him more confused    Please advise     Call back#: [phone]

## 2025-07-30 ENCOUNTER — TELEPHONE (OUTPATIENT)
Age: 65
End: 2025-07-30

## 2025-07-30 ENCOUNTER — OFFICE VISIT (OUTPATIENT)
Dept: FAMILY MEDICINE CLINIC | Facility: CLINIC | Age: 65
End: 2025-07-30
Payer: MEDICARE

## 2025-07-30 VITALS
TEMPERATURE: 98.1 F | RESPIRATION RATE: 18 BRPM | HEIGHT: 66 IN | SYSTOLIC BLOOD PRESSURE: 110 MMHG | BODY MASS INDEX: 26.03 KG/M2 | OXYGEN SATURATION: 97 % | DIASTOLIC BLOOD PRESSURE: 70 MMHG | WEIGHT: 162 LBS | HEART RATE: 112 BPM

## 2025-07-30 DIAGNOSIS — L08.9: Primary | ICD-10-CM

## 2025-07-30 DIAGNOSIS — K94.22: Primary | ICD-10-CM

## 2025-07-30 PROBLEM — C79.49: Status: ACTIVE | Noted: 2025-07-30

## 2025-07-30 PROCEDURE — 99213 OFFICE O/P EST LOW 20 MIN: CPT | Performed by: FAMILY MEDICINE

## 2025-07-30 PROCEDURE — G2211 COMPLEX E/M VISIT ADD ON: HCPCS | Performed by: FAMILY MEDICINE

## 2025-07-30 RX ORDER — MUPIROCIN 2 %
OINTMENT (GRAM) TOPICAL 2 TIMES DAILY
Qty: 15 G | Refills: 0 | Status: SHIPPED | OUTPATIENT
Start: 2025-07-30

## 2025-07-30 RX ORDER — FLECAINIDE ACETATE 50 MG/1
50 TABLET ORAL 2 TIMES DAILY
COMMUNITY
Start: 2025-07-16

## 2025-07-30 RX ORDER — METOPROLOL SUCCINATE 25 MG/1
TABLET, EXTENDED RELEASE ORAL EVERY 24 HOURS
COMMUNITY

## 2025-07-30 RX ORDER — CLOTRIMAZOLE 1 %
CREAM (GRAM) TOPICAL 2 TIMES DAILY
Qty: 15 G | Refills: 0 | Status: SHIPPED | OUTPATIENT
Start: 2025-07-30

## 2025-08-04 ENCOUNTER — TELEPHONE (OUTPATIENT)
Age: 65
End: 2025-08-04

## 2025-08-04 DIAGNOSIS — C34.12 MALIGNANT NEOPLASM OF UPPER LOBE, LEFT BRONCHUS OR LUNG (HCC): Primary | ICD-10-CM

## 2025-08-04 DIAGNOSIS — M54.16 LUMBAR RADICULOPATHY: ICD-10-CM

## 2025-08-04 RX ORDER — OXYCODONE AND ACETAMINOPHEN 5; 325 MG/1; MG/1
1 TABLET ORAL
Qty: 150 TABLET | Refills: 0 | Status: SHIPPED | OUTPATIENT
Start: 2025-08-04 | End: 2025-09-03

## 2025-08-13 ENCOUNTER — TRANSITIONAL CARE MANAGEMENT (OUTPATIENT)
Dept: FAMILY MEDICINE CLINIC | Facility: CLINIC | Age: 65
End: 2025-08-13

## 2025-08-19 ENCOUNTER — OFFICE VISIT (OUTPATIENT)
Dept: FAMILY MEDICINE CLINIC | Facility: CLINIC | Age: 65
End: 2025-08-19
Payer: MEDICARE

## 2025-08-19 ENCOUNTER — APPOINTMENT (OUTPATIENT)
Dept: LAB | Facility: CLINIC | Age: 65
End: 2025-08-19
Attending: STUDENT IN AN ORGANIZED HEALTH CARE EDUCATION/TRAINING PROGRAM
Payer: MEDICARE

## 2025-08-19 VITALS
BODY MASS INDEX: 26.03 KG/M2 | HEIGHT: 66 IN | RESPIRATION RATE: 18 BRPM | OXYGEN SATURATION: 100 % | TEMPERATURE: 98.1 F | DIASTOLIC BLOOD PRESSURE: 64 MMHG | WEIGHT: 162 LBS | HEART RATE: 72 BPM | SYSTOLIC BLOOD PRESSURE: 108 MMHG

## 2025-08-19 DIAGNOSIS — E53.8 B12 DEFICIENCY: ICD-10-CM

## 2025-08-19 DIAGNOSIS — Z13.0 SCREENING, IRON DEFICIENCY ANEMIA: ICD-10-CM

## 2025-08-19 DIAGNOSIS — E04.2 MULTINODULAR GOITER: ICD-10-CM

## 2025-08-19 DIAGNOSIS — E55.9 VITAMIN D DEFICIENCY: ICD-10-CM

## 2025-08-19 DIAGNOSIS — Z13.29 THYROID DISORDER SCREENING: ICD-10-CM

## 2025-08-19 DIAGNOSIS — I48.0 PAROXYSMAL ATRIAL FIBRILLATION (HCC): Primary | ICD-10-CM

## 2025-08-19 DIAGNOSIS — E87.8 ELECTROLYTE ABNORMALITY: ICD-10-CM

## 2025-08-19 DIAGNOSIS — I20.81 ANGINA PECTORIS WITH CORONARY MICROVASCULAR DYSFUNCTION (HCC): ICD-10-CM

## 2025-08-19 DIAGNOSIS — C34.12 MALIGNANT NEOPLASM OF UPPER LOBE, LEFT BRONCHUS OR LUNG (HCC): ICD-10-CM

## 2025-08-19 DIAGNOSIS — C79.49 MALIGNANT NEOPLASM METASTATIC TO LEPTOMENINGES (HCC): ICD-10-CM

## 2025-08-19 DIAGNOSIS — G40.909 SEIZURE DISORDER (HCC): ICD-10-CM

## 2025-08-19 DIAGNOSIS — J44.9 CHRONIC OBSTRUCTIVE PULMONARY DISEASE, UNSPECIFIED COPD TYPE (HCC): ICD-10-CM

## 2025-08-19 LAB
25(OH)D3 SERPL-MCNC: 46.7 NG/ML (ref 30–100)
ALBUMIN SERPL BCG-MCNC: 4 G/DL (ref 3.5–5)
ALP SERPL-CCNC: 56 U/L (ref 34–104)
ALT SERPL W P-5'-P-CCNC: 37 U/L (ref 7–52)
ANION GAP SERPL CALCULATED.3IONS-SCNC: 7 MMOL/L (ref 4–13)
AST SERPL W P-5'-P-CCNC: 26 U/L (ref 13–39)
BASOPHILS # BLD AUTO: 0.04 THOUSANDS/ÂΜL (ref 0–0.1)
BASOPHILS NFR BLD AUTO: 0 % (ref 0–1)
BILIRUB SERPL-MCNC: 0.53 MG/DL (ref 0.2–1)
BUN SERPL-MCNC: 19 MG/DL (ref 5–25)
CALCIUM SERPL-MCNC: 9.8 MG/DL (ref 8.4–10.2)
CHLORIDE SERPL-SCNC: 98 MMOL/L (ref 96–108)
CO2 SERPL-SCNC: 32 MMOL/L (ref 21–32)
CREAT SERPL-MCNC: 0.75 MG/DL (ref 0.6–1.3)
EOSINOPHIL # BLD AUTO: 0.68 THOUSAND/ÂΜL (ref 0–0.61)
EOSINOPHIL NFR BLD AUTO: 7 % (ref 0–6)
ERYTHROCYTE [DISTWIDTH] IN BLOOD BY AUTOMATED COUNT: 13.4 % (ref 11.6–15.1)
GFR SERPL CREATININE-BSD FRML MDRD: 94 ML/MIN/1.73SQ M
GLUCOSE SERPL-MCNC: 106 MG/DL (ref 65–140)
HCT VFR BLD AUTO: 39.3 % (ref 36.5–49.3)
HGB BLD-MCNC: 12.9 G/DL (ref 12–17)
IMM GRANULOCYTES # BLD AUTO: 0.02 THOUSAND/UL (ref 0–0.2)
IMM GRANULOCYTES NFR BLD AUTO: 0 % (ref 0–2)
LYMPHOCYTES # BLD AUTO: 1.18 THOUSANDS/ÂΜL (ref 0.6–4.47)
LYMPHOCYTES NFR BLD AUTO: 13 % (ref 14–44)
MCH RBC QN AUTO: 30.6 PG (ref 26.8–34.3)
MCHC RBC AUTO-ENTMCNC: 32.8 G/DL (ref 31.4–37.4)
MCV RBC AUTO: 93 FL (ref 82–98)
MONOCYTES # BLD AUTO: 1.27 THOUSAND/ÂΜL (ref 0.17–1.22)
MONOCYTES NFR BLD AUTO: 14 % (ref 4–12)
NEUTROPHILS # BLD AUTO: 5.95 THOUSANDS/ÂΜL (ref 1.85–7.62)
NEUTS SEG NFR BLD AUTO: 66 % (ref 43–75)
NRBC BLD AUTO-RTO: 0 /100 WBCS
PLATELET # BLD AUTO: 322 THOUSANDS/UL (ref 149–390)
PMV BLD AUTO: 11.6 FL (ref 8.9–12.7)
POTASSIUM SERPL-SCNC: 5 MMOL/L (ref 3.5–5.3)
PROT SERPL-MCNC: 6.7 G/DL (ref 6.4–8.4)
RBC # BLD AUTO: 4.21 MILLION/UL (ref 3.88–5.62)
SODIUM SERPL-SCNC: 137 MMOL/L (ref 135–147)
T4 FREE SERPL-MCNC: 1.69 NG/DL (ref 0.61–1.12)
TSH SERPL DL<=0.05 MIU/L-ACNC: 0.02 UIU/ML (ref 0.45–4.5)
VIT B12 SERPL-MCNC: 1016 PG/ML (ref 180–914)
WBC # BLD AUTO: 9.14 THOUSAND/UL (ref 4.31–10.16)

## 2025-08-19 PROCEDURE — 84443 ASSAY THYROID STIM HORMONE: CPT

## 2025-08-19 PROCEDURE — 99495 TRANSJ CARE MGMT MOD F2F 14D: CPT | Performed by: STUDENT IN AN ORGANIZED HEALTH CARE EDUCATION/TRAINING PROGRAM

## 2025-08-19 PROCEDURE — 82607 VITAMIN B-12: CPT

## 2025-08-19 PROCEDURE — 82306 VITAMIN D 25 HYDROXY: CPT

## 2025-08-19 PROCEDURE — 80053 COMPREHEN METABOLIC PANEL: CPT

## 2025-08-19 PROCEDURE — 36415 COLL VENOUS BLD VENIPUNCTURE: CPT

## 2025-08-19 PROCEDURE — 85025 COMPLETE CBC W/AUTO DIFF WBC: CPT

## 2025-08-19 PROCEDURE — 84439 ASSAY OF FREE THYROXINE: CPT

## (undated) DEVICE — TRAY EPIDURAL PERIFIX 20GA X 3.5IN TUOHY 8ML

## (undated) DEVICE — SYRINGE EPI 8ML LUER SLIP LOSS OF RESISTANCE PLASTIC PERFIX

## (undated) DEVICE — GLOVE SRG LF STRL BGL SKNSNS 7.5 PF

## (undated) DEVICE — CHLORAPREP APPLICATOR TINTED 10.5ML ONE-STEP

## (undated) DEVICE — SYRINGE 5ML LL

## (undated) DEVICE — TRAY PAIN SUPPORT

## (undated) DEVICE — RADIOLOGY STERILE LABELS: Brand: CENTURION

## (undated) DEVICE — PLASTIC ADHESIVE BANDAGE: Brand: CURITY

## (undated) DEVICE — SYRINGE LUER LOK 7ML LOSS OF RESISTANCE

## (undated) DEVICE — TOWEL SET X-RAY

## (undated) DEVICE — NEEDLE BLUNT 18 G X 1 1/2 W FILTER

## (undated) DEVICE — IV SET EXT SM BORE CARESITE 8IN

## (undated) DEVICE — FLEXIBLE ADHESIVE BANDAGE,X-LARGE: Brand: CURITY